# Patient Record
Sex: FEMALE | URBAN - METROPOLITAN AREA
[De-identification: names, ages, dates, MRNs, and addresses within clinical notes are randomized per-mention and may not be internally consistent; named-entity substitution may affect disease eponyms.]

---

## 2017-01-10 NOTE — TELEPHONE ENCOUNTER
See message,Arrived late last night from [place], Sunday  had chills and mosquito bite on upper posterior left arm, taking tylenol no further chills but generalized body aches, drinking water and tea, feels nauseated with food, requesting to be seen sooner

## 2017-01-10 NOTE — TELEPHONE ENCOUNTER
Pt calling in stts she just got back from [place] has a mosquito bit and know id having fever and muscle pain please advise   Pt has a 10m old that she breast feed ans unsure if she should worried   Pt has been scheduled for tomorrow

## 2017-01-11 NOTE — PROGRESS NOTES
HPI:    Patient ID: Marcia Royal is a 32year old female. Fever   This is a new problem. The current episode started in the past 7 days (3 to 4 days). The problem occurs constantly. The problem has been unchanged.  The maximum temperature noted was 101 distress. HENT:   Right Ear: External ear normal.   Left Ear: External ear normal.   Nose: Nose normal.   Mouth/Throat: Oropharynx is clear and moist. No oropharyngeal exudate.    Eyes: Conjunctivae are normal. Pupils are equal, round, and reactive to lig Human, Igg/Igm [E]; Future  - XR CHEST PA + LAT CHEST (CPT=71020); Future  - Respiratory Panel FLU A + B, RSV [E]    3. Encounter for pregnancy test, result unknown  Negative.     - POC Urine pregnancy test [[id number]]      No orders of the defined types were p

## 2018-05-13 NOTE — TRIAGE
[facility]      Triage Note    Paula Ellis Patient Status:  Observation    1985 MRN [id number]   Location [address] Attending Gurjit Spear, *   Hosp Day # 0 PCP Bruno Urbina MD noticed a streak of blood when wiping after urinating. Denies urinary complaint. Denies LOF. This am, she noticed blood again with wiping. No recent intercourse. Speculum exam perfomed per verbal order. White discharge from cervical os noted.  Blood noted i

## 2018-05-13 NOTE — PROGRESS NOTES
Pt presents c/o pelvic pain starting in her hips and radiating to her symphysis pubis. No relieving/aggravating factors. Pt also having pain in her low back. Pt states last night she noticed a streak of blood when wiping after urinating.  Denies urinary com

## 2018-06-11 NOTE — TRIAGE
[facility]      Triage Note    Keren Hong Patient Status:  Observation    1985 MRN [id number]   Location [facility] Attending Elisa Padilla MD   Hosp Day # 0 PCP MD Jason Medel Nonstress Test Second Interpretation: Appropriate for gestational age          FHR Category: Category I           Additional Comments       Reason for visit: Pt came in with c/o \"waking up to a fluid/blood tinged soaked area on the sofa\".  Pt denies

## 2018-08-03 NOTE — L&D DELIVERY NOTE
[facility] - [facility]    Vaginal Delivery Note    Travis Shay Patient Status:  Inpatient    1985 MRN [id number]   Location [address] Attending Ed Rivera MD   Hosp Day # 0 PCP MD Daysi Breaux

## 2018-08-03 NOTE — H&P
45  R[facility]hi & Opal Childers Patient Status:  Inpatient    1985 MRN [id number]   Location [address] Attending Will Kincaid MD   Hosp Day # 0 PCP Gaurav Davison MD     Date

## 2018-08-03 NOTE — PROGRESS NOTES
· Nitrous Oxide self-administration education provided by: Jackie Jay  · Provider instructed patient and support person on the use of nitrous oxide use for pain management and self-administration policy  · Consent obtained  · Equipment set up done by [facility]

## 2018-08-03 NOTE — DISCHARGE SUMMARY
[facility] - [facility]    Discharge Summary    Raúl Briscoe Alvarado Patient Status:  Inpatient    1985 MRN [id number]   Location [address] [facility] Attending Ed Rivera MD   [facility] Day # 2       Admission Date: 8/3/2018  Disc

## 2018-08-04 NOTE — LACTATION NOTE
LACTATION NOTE - MOTHER      Evaluation Type: Inpatient         Maternal history  Maternal history: Anemia    Breastfeeding goal  Breastfeeding goal: To maintain breast milk feeding per patient goal    Maternal Assessment  Bilateral Breasts: Soft  Bilatera

## 2018-08-04 NOTE — PROGRESS NOTES
[facility]    OB/GYNE Progress Note      Chio Childers Patient Status:  Inpatient    1985 MRN [id number]   Location [facility] 3SE Attending Yadira Long MD   Hosp Day # 1 PCP Joi Perry MD       Assessment/Geo

## 2018-08-04 NOTE — LACTATION NOTE
This note was copied from a baby's chart.   LACTATION NOTE - INFANT    Evaluation Type  Evaluation Type: Inpatient    Problems & Assessment  Problems Diagnosed or Identified: 37-38 weeks gestation  Infant Assessment: Skin color: pink or appropriate for ethn

## 2018-08-05 NOTE — LACTATION NOTE
Infant feeding well at the breast per mom. Mom has pump at home and advised to make an outpatient appointment if any difficulties with feeding.

## 2018-08-05 NOTE — PROGRESS NOTES
[facility]    OB/GYNE Progress Note      Robert Childers Patient Status:  Inpatient    1985 MRN [id number]   Location [facility] 3SE Attending Leigh Inman MD   Hosp Day # 2 PCP Madisyn Jauregui MD       Assessment/Geo

## 2020-05-28 NOTE — TELEPHONE ENCOUNTER
PT WENT TO ER AT [facility] AND WAS TOLD SHE IS PREGNANT. STATES IRREGULAR CYCLES, WILL SOMETIMES SKIP A MONTH BUT USUALLY NEVER MORE THAN THAT. PT DOES NOT LIKE HER PREVIOUS OB GROUP AND WOULD LIKE TO GET CARE WITH US.   MISSED MENSES APPT SCHEDULED WITH MICHELE PÉREZ

## 2020-05-28 NOTE — TELEPHONE ENCOUNTER
Per pt unsure on LMP as cycles are irregular and states found out she was pregnant in the ER.  Please advise

## 2020-06-02 NOTE — PROGRESS NOTES
Paula Ellis is a 29year old female  No LMP recorded (lmp unknown). Patient presents with:  Gyn Problem: missed menses. Cari Mustelias went to the emergency room and had and ultrasound done and was told she was pregnant but nothing was found on the ultrasound i lesions  Urethral Meatus:  normal in size, location, without lesions and prolapse  Bladder:  No fullness, masses or tenderness  Vagina:  Normal appearance without lesions, no abnormal discharge  Cervix:  Normal without tenderness on motion  Uterus: normal

## 2020-06-02 NOTE — TELEPHONE ENCOUNTER
Reviewed US results with patient. Reviewed gestational sac without embryo. HCG pending from day. Recommend repeat in 48 hours to monitor better. Pt feeling well no pain.

## 2020-06-04 NOTE — TELEPHONE ENCOUNTER
Called patient. Reviewed HCG levels. HCG is rising although it is not as much as we would hope. This may be a miscarriage but we need to repeat the ultrasound. She will need to repeat ultrasound in 7 days. Reviewed bleeding precautions.

## 2020-06-10 NOTE — TELEPHONE ENCOUNTER
Called by [address] with results. C/w blighted ovum. Spoke to patient. Discussed r/b of expectant mgmt, cytotec, and suction D&C. Pt wishes to speak with her  and will let us know. All questions answered.     Us Pregnancy Less Than 14 Weeks (transabdom

## 2020-06-12 NOTE — TELEPHONE ENCOUNTER
PT IS HAVING A HARD TIME MAKING A DECISION. ANSWERED QUESTIONS RE: SURG IS GEN ANESTHESIA AND WOULD NEED ORDER TO BE SCHEDULED EARLY NEXT WEEK, NOT DONE ON WEEKENDS. OR CYTOTEC IS PLACED IN THE OFFICE. WILL HAVE BAD PERIOD CRAMPING UNTIL TISSUE IS EXPELLED THEN A MORE REGULAR PERIOD LIKE BLEED FOR ABOUT 4-7 DAYS. PT WILL CALL BACK LATER WITH DECISION.

## 2020-06-12 NOTE — TELEPHONE ENCOUNTER
Pt talked to [address] [place] [address] about a [address] or a taking Medicine.  Pl has additional questions to help her make a decision

## 2020-06-16 NOTE — TELEPHONE ENCOUNTER
OK per KCB. Appt made at 8:40 is [facility]. Pt informed. Message to [address] for dosing of cytotec. Per Ray Mendoza, we need dosing to send with KCB to [facility]. Once we have dose, please send to Ray Mendoza.

## 2020-06-16 NOTE — TELEPHONE ENCOUNTER
Pt asked if she will need someone to drive her to the appt. Pt informed she should be able to drive after placement. Pt is requesting an appt with a woman  tomorrow. The only appt available tomorrow is with BONNIE in [facility].   Pt informed we will have to letha

## 2020-06-17 NOTE — PATIENT INSTRUCTIONS
· Please call with any questions or concerns. · Please call with heavy vaginal bleeding that is saturating more than a pad an hour for two hours or if you have lightheadedness/dizzines, shortness or breath or chest pain.    · Please call with severe julienne

## 2020-06-17 NOTE — PROGRESS NOTES
Keren Hong is a 29year old  who presents today for cytotec placement. She has blighted ovum on US. Reviewed US results with patient. She denies bleeding and cramping at this time.      OBSTETRICS HISTORY:  OB History     T4    L4    SAB Other Topics      Concerns:         Service: Not Asked        Blood Transfusions: Not Asked        Caffeine Concern: No        Occupational Exposure: Not Asked        Hobby Hazards: Not Asked        Sleep Concern: Not Asked        Stress Concern: N misoprostol (CYTOTEC) tab 800 mcg        Plan:  1. 800mcg cytotec placed PV  2. Reviewed risks of bleeding, incomplete AB (need for D&C) and infection. 3. Reviewed heavy vaginal bleeding and severe cramping can occur (declines Norco).  Reviewed to watch b

## 2020-06-22 NOTE — TELEPHONE ENCOUNTER
Pt states she was supposed to call Friday with an update but she forgot. Pt states she had bleeding that was slightly heavier than a period for the first 2 days, then it turned like a period. Today she is having light bleeding.   Pt states she did not pas

## 2020-06-22 NOTE — TELEPHONE ENCOUNTER
Informed pt that [address] stated that she should go to the lab in the next few days to get the hcg drawn. Pt stated understanding that she will go for the hcg and call the next day for the results.     Informed pt that [address] stated that we will see how her bleedi

## 2020-06-22 NOTE — TELEPHONE ENCOUNTER
Please have patient go to the lab in the next few days to get HCG drawn. We will see how her bleeding is this week and how her HCG drops. Please have her see me on Tuesday the 30th at 12:40.  Please let Moose Voss know I will need staffing

## 2020-06-22 NOTE — TELEPHONE ENCOUNTER
Sent to [place] to add pt to [employer] schedule and provide staffing for [facility].

## 2020-06-30 NOTE — TELEPHONE ENCOUNTER
Pt was coming in to see Govind Collins today for Follow up with Govind Collins first available apt is 7/24, pt wants to know if ok to wait that long

## 2020-06-30 NOTE — TELEPHONE ENCOUNTER
Pt was scheduled today at 1240pm for f/u and had to cx due to her car breaking down. Pt reports current spotting. Pt denies pain or cramping. Informed pt message will be sent to [facility] to see when she can be seen. Pt verbalized understanding.

## 2020-06-30 NOTE — TELEPHONE ENCOUNTER
No problem. Please have patient get blood drawn for HCG again. She can be booked in two OB slots or New OB slot for follow up. This can be when it works for patient.

## 2020-06-30 NOTE — TELEPHONE ENCOUNTER
Informed pt of KCB recs below. Assisted pt with scheduling appt for 7/8/2020 at 8am in [facility]. Location information provided to pt. Pt verbalized understanding.

## 2020-07-04 NOTE — ED PROVIDER NOTES
Patient Seen in: [facility] Emergency Department    History   Patient presents with:  Dimas-ROSSY      HPI    Patient presents to the ED complaining of heavy vaginal bleeding starting tonight.   She states that she woke with a pool of blood, lower abdom was already wearing a droplet mask on my arrival to the room.  Personal protective equipment including droplet mask, eye protection, and gloves were worn throughout the duration of the exam.  Handwashing was performed prior to and after the exam.  [facility] Please view results for these tests on the individual orders. TYPE AND SCREEN    Narrative: The following orders were created for panel order TYPE AND SCREEN.                   Procedure                               Abnormality uterine bleeding versus now miscarrying from previous Cytotec. Patient improved during her ED stay. Bleeding steadily decreased until minimal at time of discharge. No significant anemia. Vital signs stable.   I feel stable for discharge home with outpat

## 2020-07-04 NOTE — ED INITIAL ASSESSMENT (HPI)
Received cytotech for miscarriage on 6/17, bleeding since.  Tonight, pt woke up in a pool of blood. +dizziness and states she feels like she is going to pass out. +abd cramping

## 2020-07-08 NOTE — PROGRESS NOTES
Michael Laguna is a 29year old female  No LMP recorded (lmp unknown). Patient presents with: Follow - Up    The patient is a 30yo female who presents today for follow up after cytotec placement after mAB. Pt had cytotec placement on 2020.  She on file    Relationships      Social connections:        Talks on phone: Not on file        Gets together: Not on file        Attends Confucianist service: Not on file        Active member of club or organization: Not on file        Attends meetings of clubs depression or anxiety     07/08/20  0820   BP: 104/68   Pulse: 65       PHYSICAL EXAM:   Constitutional: well developed, well nourished  Head/Face: normocephalic  Abdomen:  soft, nontender, nondistended, no masses  Skin/Hair: no unusual rashes or bruises

## 2021-02-18 NOTE — PROGRESS NOTES
HPI:    Patient ID: Bimal Jansen is a 28year old female. Patient presents today for her annual physical.  She complains of current episode of swelling or fluid retention of her hands as well as her feet and her  face after having eaten [place] food. Smokeless tobacco: Never Used    Alcohol use: No    Drug use: No       PHYSICAL EXAM:    Physical Exam         ASSESSMENT/PLAN:   (Z00.00) Annual physical exam  (primary encounter diagnosis)  Plan: CBC WITH DIFFERENTIAL WITH PLATELET, COMP         METABOLI

## 2021-04-03 NOTE — TELEPHONE ENCOUNTER
From: Rufina Henderson Sales  To: Juan Pablo Garza MD  Sent: 4/2/2021 6:08 PM CDT  Subject: Non-Urgent Medical Question    Hello,    I have an appt scheduled for April 6 for a physical and an annual pap smear.  I just found out last week via home pregnant test that

## 2021-04-06 NOTE — TELEPHONE ENCOUNTER
Pt states +HPT, pt states irregular periods, pt states they are sometimes a week off.  Pt believes her LMP was the last week of Feb.     Pt informed of male and female providers and rotation of PN appts with all of them and OB on-call will deliver, pt state

## 2021-04-13 NOTE — TELEPHONE ENCOUNTER
Called ultrasound and talked to a Dami. Informed ultrasound that pt's appt with ULISES was 4/23 for her New OB MD appt. Informed her that pt needs her ob ultrasound appt. Ultrasound Department, Dami, gave appt for Sharif 10:45 AM on 4/22.     Inform

## 2021-04-13 NOTE — TELEPHONE ENCOUNTER
Patient had OB EDU visit and needed to schedule an ultrasound before her doctors visit on April 23rd. States no appointments available until April 27th.      Please advise patient if this is ok

## 2021-04-13 NOTE — PROGRESS NOTES
Pt called for OBN appt today with no complaints. 1hr glucose, Hep C, OB ultrasound for dating and Normal PN labs ordered. Pt advised all labs must be completed and resulted 2 days prior to MD appt.  RN scheduled NPN appt with MD.    Pt HT: 5'1\" WT: 105

## 2021-04-20 NOTE — TELEPHONE ENCOUNTER
----- Message from Kirill Juan MD sent at 4/20/2021  2:42 PM CDT -----  Urine culture-- entercocccus.    Macrobid x 7 days

## 2021-04-21 NOTE — TELEPHONE ENCOUNTER
Pt transferred from call center with questions about her US that is scheduled for tomorrow. Pt stated she needs US done for dating due to irregular cycles.  Pt informed of message below from Arabella Ott that [facility] community states that pt needs to come in for a pel

## 2021-04-21 NOTE — TELEPHONE ENCOUNTER
Since pt Is [facility] must come in for appt (GYNE) to get a pelvic exam done in order for [employer] approve authorization .     Related message to pt verbalized understandings

## 2021-04-22 NOTE — TELEPHONE ENCOUNTER
Patient was going to go for an ultrasound today to help determine how far along she is. Her insurance is requiring a pelvic exam be done first.  She would like to verify that the pelvic exam will be done at her New OB appt tomorrow. Please advise.

## 2021-04-22 NOTE — TELEPHONE ENCOUNTER
Pt informed that a pelvic exam will be done at her appt. Pt asking if it ok that she will not have the [address] prior to her appt. She thought she needed it done before she was to be seen by the md.  Pt reassured that it is ok that US was not done.   Pt advised

## 2021-04-23 NOTE — TELEPHONE ENCOUNTER
[id number] sent for clinical review, uploaded clinicals to [employer]        [id number] placed, [employer] sent to pt advising must get ultrasound done before scheduling FTS, MFM number provided via [employer]     Will obtain FTS PA once gets US done , has US vickie

## 2021-04-23 NOTE — PROGRESS NOTES
New OB. No major complaints. Wants genetics. Unknown LMP. History of irregular cycles. No menses in January and had a period sometime in mid February. Plan for US for dates. History of recurrent UTIs and on abx currently.   Plan for urine culture q tr

## 2021-04-23 NOTE — TELEPHONE ENCOUNTER
Pt with unknown LMP and irregular cycles. Needs US for dates first.  Pt with [facility]. Seen today for New OB visit. Also will want MFM FTS, but needs to be done once US completed for dates. Will need level 2 for AMA and history of IUGR.

## 2021-05-06 NOTE — TELEPHONE ENCOUNTER
Informed pt that pap/hpv neg. Informed pt that yeast on pap. Informed pt that [address] stated given preg, rec Monistat 7. Pt stated understanding.

## 2021-05-06 NOTE — TELEPHONE ENCOUNTER
----- Message from Lon Duverney, DO sent at 5/6/2021 12:52 PM CDT -----  Pap/hpv neg. Yeast on pap.   Given pregnancy, rec monistat 7

## 2021-05-06 NOTE — TELEPHONE ENCOUNTER
NADIA is updated based on 5/3 ultrasound.  Kenzie Mcgrath from scheduling in communication with patient (see TE on 4/23) regarding genetics with MFM coordination.         ----- Message from Sheryle Proud, DO sent at 5/6/2021  1:06 PM CDT -----  Please update NADIA

## 2021-05-11 NOTE — PROGRESS NOTES
Outpatient Maternal-Fetal Medicine Consultation    Dear Dr. Yuki Finney    Thank you for requesting ultrasound evaluation and maternal fetal medicine consultation on your patient Melanie CHEN Alvarado.   As you are aware she is a 28year old female [id number] with a si acetaminophen 500 MG Oral Tab, Take 1,000 mg by mouth.    (Patient not taking: Reported on 4/23/2021 ), Disp: , Rfl:   •  Cholecalciferol (VITAMIN D3) 125 MCG (5000 UT) Oral Tab, Take 5,000 Units by mouth., Disp: , Rfl:   •  ferrous sulfate 325 (65 FE) MG growth (at 32 weeks). In addition, weekly NST's (initiating at 36 weeks gestation for women 35-39 years and at 28 weeks gestation for women 40 years and older) are also advised.   Routine obstetric care is more than adequate to assess for gestational diabe fetal DNA  today, declined invasive testing  · Prior pregnancy complicated by IUGR    RECOMMENDATIONS:  · Continue care with Dr. Christopher Crocker II at 20 weeks.   Follow-up Growth ultrasound at 26 & 32 weeks -more frequent growth ultrasounds and fetal surv

## 2021-05-11 NOTE — TELEPHONE ENCOUNTER
Spoke to pt. Advised PA has been approved, and has nothing to worry about.  Verbalized understandings

## 2021-05-20 NOTE — TELEPHONE ENCOUNTER
HARMONY screening results obt  Reviewed by MD Arzola Men  Low risk for  Trisomy 21  1:83235 risk  Low risk for Trisomy 18/13  1:43327 risk  Fetal sex:  Male   LVMW#TCB   Copy of results sent for scanning into pt record

## 2021-06-30 NOTE — PROGRESS NOTES
Outpatient Maternal-Fetal Medicine Consultation    Dear Dr. Senait Sylvester    Thank you for requesting ultrasound evaluation and maternal fetal medicine consultation on your patient Nael Childers.   As you are aware she is a 28year old female [id number] with a si ferrous sulfate 325 (65 FE) MG Oral Tab EC, Take 325 mg by mouth 2 (two) times daily with meals. , Disp: , Rfl:   •  Ascorbic Acid (VITAMIN C) 1000 MG Oral Tab, Take 1,000 mg by mouth daily. , Disp: , Rfl:   •  PRENATAL 27-0.8 MG Oral Tab, Take 1 tablet by as a result of diminished uteroplacental blood flow from faulty development, acquired obstruction, or disruption of the uteroplacental vasculature.  Examples of these disorders include hypertension, renal insufficiency, diabetes, collagen vascular disease, with Dr. Sundeep Bowers  Follow-up Growth ultrasound at 26 & 32 weeks -more frequent growth ultrasounds and fetal surveillance if recurrent IUGR noted  Weekly NST's at 36 weeks. Thank you for allowing me to participate in the care of your patient.   Please

## 2021-07-02 NOTE — TELEPHONE ENCOUNTER
----- Message from [facility] sent at 6/30/2021 11:54 AM CDT -----  Regarding: MFM PRIOR AUTHORIZATION  PATIENT SCHEDULED FOR GROWTH AND BPP 8/11/21 AND REQUIRES PRIOR AUTHORIZATION. 12906 AND 13241.  THANK YOU.    BLAISE SANDOVAL  PSR AT [facility] MATERNAL FET

## 2021-07-02 NOTE — TELEPHONE ENCOUNTER
Called [facility] and spoke to Mrs. Guo to initiate PA for [id number], [id number]. Dx: O09.529, O09.291. Appt is scheduled on 8/11/21. Case #[id number] and [id number]. Await response.

## 2021-07-07 NOTE — TELEPHONE ENCOUNTER
Received fax notification of approval for 35159 x2 units. Received fax notification of denial for 76049 x2 units due to pt needs to be at least 32 weeks pregnant. Pt is not scheduled for 47321 yet.  Will wait for PA process due to order can change to week

## 2021-08-10 ENCOUNTER — TELEPHONE (OUTPATIENT)
Dept: OBGYN CLINIC | Facility: CLINIC | Age: 36
End: 2021-08-10

## 2021-08-10 DIAGNOSIS — M54.32 SCIATIC PAIN, LEFT: Primary | ICD-10-CM

## 2021-08-10 NOTE — TELEPHONE ENCOUNTER
LMTCB. Order placed for physical therapy. Please provide pt with # to physical therapy when she calls back: [phone]. Mattie Corey thank you.

## 2021-08-10 NOTE — PROGRESS NOTES
C/o ankle swelling- rec compression tights/socks, c/o L sciatica- rec tylenol- pt wants to see PT-will refer, labs normal

## 2021-08-11 NOTE — PROGRESS NOTES
Outpatient Maternal-Fetal Medicine Consultation    Dear Dr. Jovita Conroy    Thank you for requesting ultrasound evaluation and maternal fetal medicine consultation on your patient Brianna Childers.   As you are aware she is a 28year old female [id number] with a si ferrous sulfate 325 (65 FE) MG Oral Tab EC, Take 325 mg by mouth 2 (two) times daily with meals. , Disp: , Rfl:   •  Ascorbic Acid (VITAMIN C) 1000 MG Oral Tab, Take 1,000 mg by mouth daily. , Disp: , Rfl:   •  PRENATAL 27-0.8 MG Oral Tab, Take 1 tablet by as a result of diminished uteroplacental blood flow from faulty development, acquired obstruction, or disruption of the uteroplacental vasculature.  Examples of these disorders include hypertension, renal insufficiency, diabetes, collagen vascular disease,

## 2021-08-14 NOTE — TELEPHONE ENCOUNTER
----- Message from Hansa Beard MD sent at 8/13/2021  1:24 PM CDT -----  Pt is anemic and needs to start slow fe 1 tab po every day at a separate time from her pnv.   She also has abnormal 1 hour GTT and needs to have a 3 hour GTT- please order

## 2021-08-23 NOTE — PROGRESS NOTES
PHYSICAL THERAPY  EVALUATION:   Referring Physician: Dr. Dillon Sung  Diagnosis: Sciatic pain, left (M54.32)  Back Pain in Pregnancy  Date of Onset: 8/10/21  Date of Service: 8/23/2021     PATIENT Kathryn Hager is a 28year old female who presents t • UTI (urinary tract infection)     Frequent         Safety questions:    Are you being hurt, frightened, demeaned, or taken advantage of by anyone at your home or in your life? No       Have you recently had thoughts of hurting yourself?   No    Have yo below  Tenderness noted on paraspinals and pubic symphysis and  L gluteal/PSIS area  upon palpation  Pt noted to have decreased and painful  SLS on LLE with impaired load transfers on each LE. Eccentric control cannot be tested at this time. Transitional tr are WFL painfree done actively except LLE motions; All motions are painful: L hip flexion limited at 50 degrees assisted, 20 degrees abd and painful with ER/IR.  She is only able to flex  Knee at 110 versus 140 on th R side  MMT are modified and painful when and outcome measures, this evaluation involved Moderate Complexity decision making due to 3+ personal factors/comorbidities, 3 body structures involved/activity limitations, and evolving symptoms including changing pain levels.     PLAN OF CARE:    Goals: ( sign and return this letter via fax as soon as possible to [phone].  If you have any questions, please contact me at Dept: [phone]    Sincerely,  Electronically signed by therapist: Pepper Frias  Physician's certification required:

## 2021-08-25 NOTE — PROGRESS NOTES
Dx:  Sciatic pain, left (M54.32)  Back Pain in Pregnancy         Insurance   [facility] community         Authorized  # visits by insurance  :  13   Expiration date  of Authorization:10/22/21 (90 days from eval otherwise)  Initial POC# of visits: 12         Eval d isometrics  10 secs x10  SL clams attempted:painful  Supine hooklying hip abd with isometrics painful         Manual: MFR in R SL for  L/PSIS  gluteal area and L side paraspinals:gentle lengthening and circular techniques  PT did break  STM times in jet

## 2021-09-01 ENCOUNTER — TELEPHONE (OUTPATIENT)
Dept: OBGYN CLINIC | Facility: CLINIC | Age: 36
End: 2021-09-01

## 2021-09-01 NOTE — TELEPHONE ENCOUNTER
29w3d. Pt states she noted that when walking up the stairs she felt dampness between her legs. Pt states she went to the BR and noted some wetness to her underwear and pants. Pt states she does not believe she urinated on herself. Pt state fluid does not have a color or odor. Pt denies UTI s/s. Pt states +FM, denies VB or ctxs. Pt states baby is kicking a lot and she feels pressure in her pelvic region. Pt instructed to void, place rohan pad and stay in upright position for the next hour and that RN will call her back to see if pad has fluid. Pt instructed to call back if gross rupture noted with fluid running down leg or if pad becomes soaked before hour is over. Pt states understanding.

## 2021-09-01 NOTE — TELEPHONE ENCOUNTER
Called pt back, pt check rohan pad while on the phone with RN. States she notes wetness on the pad. Pt states the fluid on pad is clear, and approx quarter size amount. Pt states she still feels pelvic pressure, that has not increased since earlier call. Pt instructed to head to [facility] triage to r/o SROM. Informed pt to make sure to wear rohan pad to [facility]. Instructions on where to park and enter the building, including use of the phone at [facility] entrance explained. Pt states understanding and will head to [facility] now. Kiesha De La Torre RN [facility] Triage notified of pt coming. To JANIS on-call, mina. Thank you.

## 2021-09-02 NOTE — TRIAGE
[facility]      Triage Note    Carroll Cliftonnadai Patient Status:  Observation    1985 MRN [id number]   Location [address]  Attending Guicho Steinberg, 3 [facility] Day # 0 PCP Ailyn Walden MD     [facility] Interpretation: Appropriate for gestational age                                    Additional Comments       Patient presents with:  R/o Rom: c/o of wet,moist underwear last week and today at 5 pm, no contractions  's, NST reactive. Speculum done.  a

## 2021-09-02 NOTE — PROGRESS NOTES
Pt is a 28year old female admitted to TR2/TR2-A. Patient presents with:  R/o Rom: c/o of wet,moist underwear last week and today at 5 pm, no contractions     Pt is [id number] 29w3d intra-uterine pregnancy. History obtained, consents signed.  Oriented to ro

## 2021-09-08 NOTE — PROGRESS NOTES
Pt advised of results and recs. Pt states she is on day 5 of Monistat. Advised pt to complete Monistat and does not need duplicate med with Diflucan. Pt agrees.

## 2021-09-09 NOTE — PROGRESS NOTES
No S/S of PTL. Seen [facility] last week to r/o SROM. Treated for yeast after culture. Alberto Daily and [place] all well. Will need PEDS for hospital only.

## 2021-09-10 NOTE — PROGRESS NOTES
Dx:  Sciatic pain, left (M54.32)  Back Pain in Pregnancy         Insurance   [facility] community         Authorized  # visits by insurance  :  13   Expiration date  of Authorization:10/22/21 (90 days from eval otherwise)  Initial POC# of visits: 12         Eval d

## 2021-09-21 NOTE — PROGRESS NOTES
Outpatient Maternal-Fetal Medicine Consultation    Dear Dr. Gabo Kuo    Thank you for requesting ultrasound evaluation and maternal fetal medicine consultation on your patient Pawan Childers.   As you are aware she is a 28year old female [id number] with a si ferrous sulfate 325 (65 FE) MG Oral Tab EC, Take 325 mg by mouth 2 (two) times daily with meals. , Disp: , Rfl:   •  Ascorbic Acid (VITAMIN C) 1000 MG Oral Tab, Take 1,000 mg by mouth daily. , Disp: , Rfl:   •  PRENATAL 27-0.8 MG Oral Tab, Take 1 tablet by as a result of diminished uteroplacental blood flow from faulty development, acquired obstruction, or disruption of the uteroplacental vasculature.  Examples of these disorders include hypertension, renal insufficiency, diabetes, collagen vascular disease,

## 2021-09-21 NOTE — TRIAGE
[facility]      Triage Note    Yolanda Reyes Patient Status:  Observation    1985 MRN [id number]   Location [address]  Attending Kayy Weiner MD   Hosp Day # 0 PCP MD Joshua Mcpherson FHR Category: Category I             Additional Comments   Pt arrived from [facility] with complaints of abdominal discomfort and cramping since 2130 and leaking of fluid around 2130. Pt reports good fetal movement, denies vaginal bleeding.  FHT r

## 2021-09-21 NOTE — PROGRESS NOTES
Pt for Growth US  States abd tightening and discomfort since last shweta   Did not call OB  It went away and resumed at 5 am pt appears uncomforable  States active fetus

## 2021-09-21 NOTE — PROGRESS NOTES
Dx:  Sciatic pain, left (M54.32)  Back Pain in Pregnancy         Insurance   [facility] community         Authorized  # visits by insurance  :  13   Expiration date  of Authorization:10/22/21 (90 days from eval otherwise)  Initial POC# of visits: 12         Eval d Treatment: 0 min  Total Treatment Time: 10 min

## 2021-09-23 NOTE — TELEPHONE ENCOUNTER
Pt is 32 weeks and needs a note if she can continue therapy session,  Therapist is worry some of the exercises might cause contraction.  Pt is asking if a nurse can call her .  ,

## 2021-09-23 NOTE — TELEPHONE ENCOUNTER
Therapist send a note to [facility] per prenatal pt. Therapist stating pt will need a clearance to continue with PT for sciatica. Pt states she had had  PT therapy before her [facility] appointment. She had felt contractions and pt states she went to [facility].       The

## 2021-09-27 NOTE — TELEPHONE ENCOUNTER
Pt  Didn't come in  For a appt and has therapy today asking fi she can do therapy and needs a letter to put in my chart , .   Asking for a Nurse call you  ,

## 2021-09-27 NOTE — TELEPHONE ENCOUNTER
Is it ok for pt to come in today (9/27) in [facility] with Dr. Shruti Josue at 2pm or 2:10pm?  Pt has cancelled PT.     Please advise

## 2021-09-27 NOTE — TELEPHONE ENCOUNTER
Pt going to see Rosalio Avila today at [place] and cxl her PT appt for today. Once she sees ELMAK and talks to her they will discuss if she can continue with PT. Transferred to [facility] to help pt cxl her appt and schedule appt today with MICHELE.

## 2021-09-29 NOTE — PROGRESS NOTES
Tdap administered to pts right deltoid. Pt tolerated injection well. Pt also stated she needed clearance note from MD clearing her to go to back to PT. Pt stated she was seen in [facility] last week with contractions. No issues since then.  Spoke with SAMUEL who

## 2021-09-29 NOTE — TELEPHONE ENCOUNTER
PN has an appointment for Oct. 7th, 2021 - Too early -  (appointment has not been cancelled), needs to be reschedule to the week of Oct. 11th. 2021.

## 2021-10-01 NOTE — PROGRESS NOTES
Dx:  Sciatic pain, left (M54.32)  Back Pain in Pregnancy         Insurance   [facility] community         Authorized  # visits by insurance  :  13   Expiration date  of Authorization:10/22/21 (90 days from eval otherwise)  Initial POC# of visits: 12         Eval d painful: but able to do [place] in all planesShe is  able to flex   L  Knee = R side    Goals:   goals addressed this day as noted above  Goals: (to be met in 12 visits)  1.   Pt will be I with HEP,its progression and management of symptoms/precautions, biomech exercises no resistance flex/abd/ext:pain on LLE loading  Shuttle BLE 4 bands 2x10  Shuttle R/ LE 2 bands 2x10  Pelvic motions in quadruped: side to side/cat and camel x10  Child pose x30 secs       Manual: MFR in R SL for  L/PSIS  gluteal area and L side

## 2021-10-05 NOTE — PROGRESS NOTES
Dx:  Sciatic pain, left (M54.32)  Back Pain in Pregnancy         Insurance   [facility] community         Authorized  # visits by insurance  :  13   Expiration date  of Authorization:10/22/21   Initial POC# of visits: 12         Eval date/latest PN:8/23/21    Auth be met in 12 visits)  1. Pt will be I with HEP,its progression and management of symptoms/precautions, biomechanical strategies and self correction of upright posturing to maintain gains in therapy:reports compliance  2. Pt will report overall decreased in flex/abd/ext:pain on LLE loading  Shuttle BLE 4 bands 2x10  Shuttle R/ LE 2 bands 2x10  Pelvic motions in quadruped: side to side/cat and camel x10  Child pose x30 secs   NU step level 4 x6 mins:inc pain on lower abdominals better after walking  Standing B

## 2021-10-07 NOTE — PROGRESS NOTES
Dx:  Sciatic pain, left (M54.32)  Back Pain in Pregnancy         Insurance   [facility] community         Authorized  # visits by insurance  :  13   Expiration date  of Authorization:10/22/21   Initial POC# of visits: 12         Eval date/latest PN:8/23/21    Auth flex   L  Knee = R side    Goals:   goals addressed this day as noted above  Goals: (to be met in 12 visits)  1.   Pt will be I with HEP,its progression and management of symptoms/precautions, biomechanical strategies and self correction of upright posturin L hip  Tra with hip isometrics 10 secs x10  Standing hip exercises no resistance flex/abd/ext:pain on LLE loading  Shuttle BLE 4 bands 2x10  Shuttle R/ LE 2 bands 2x10  Pelvic motions in quadruped: side to side/cat and camel x10  Child pose x30 secs   NU s

## 2021-10-12 NOTE — PROGRESS NOTES
Dx:  Sciatic pain, left (M54.32)  Back Pain in Pregnancy         Insurance   [facility] community         Authorized  # visits by insurance  :  13   Expiration date  of Authorization:10/22/21   Initial POC# of visits: 12         Eval date/latest PN:8/23/21    Auth compliance  2. Pt will report overall decreased in pain and symptoms and functional limitations  by 50% or better for ease of walking/standing as pregnancy progresses for ease of bed mobility:40%  3. Pt will be able to do progressive lumbropelvichip stabiliz 2x10 NU step level 4 x6 mins  Standing B  hip ex flex/ext 2x10 xRTB   Shuttle BLE 5 bands 2x10  Shuttle R/ LE 3 bands 2x10  SL hip abd 2x10  SL clams 2x10  SL hip flexion 2x10   Manual: STM with foam roll x3 mins,  MFR on PSIS/gluteal area   MFR on PSIS/gl

## 2021-10-19 NOTE — PROGRESS NOTES
Outpatient Maternal-Fetal Medicine Consultation    Dear Dr. Cinthya Silva    Thank you for requesting ultrasound evaluation and maternal fetal medicine consultation on your patient Ysabel Childers.   As you are aware she is a 28year old female [id number] with a si hours as needed for Pain., Disp: , Rfl:   •  Cyanocobalamin (VITAMIN B 12 OR), Take by mouth.  , Disp: , Rfl:   •  ferrous sulfate 325 (65 FE) MG Oral Tab EC, Take 325 mg by mouth 2 (two) times daily with meals. , Disp: , Rfl:   •  Ascorbic Acid (VITAMIN C) pregnancies. Karyotypic abnormalities account for up to 20 percent of all FGR.     Maternal medical disorders may cause FGR as a result of diminished uteroplacental blood flow from faulty development, acquired obstruction, or disruption of the uteroplacen

## 2021-10-20 NOTE — TELEPHONE ENCOUNTER
Called Omar and spoke to [place]. [facility] pt called this morning and has an appt scheduled with Dr Loretta Patel on 10/22 at 9:30 am. [facility] Dr Neetu Cotton will be out of clinic for 3 weeks.

## 2021-10-20 NOTE — TELEPHONE ENCOUNTER
----- Message from Blake Rashid DO sent at 10/19/2021  5:18 PM CDT -----  I called Chanelle and discussed worsening anemia. She is compliant with daily PNV and separate Slow Fe daily. Hgb is decreasing. Please arrange referral for Hematology.  Code is on

## 2021-10-22 NOTE — TELEPHONE ENCOUNTER
I called Chanelle to schedule her iron infusion appt. There was no answer so I left a voicemail offering 10/27 at 2pm, asking her to call back to schedule.

## 2021-10-22 NOTE — CONSULTS
[address]  Initial Hematology Clinic Consult Note    Patient Name: Yulisa Dick   YOB: 1985   Medical Record Number: [id number]  Referring Physician(s): Mauro Alfaro [address] [place] [address]    Reason for consultation: anemia of pregnancy RASH    Family Medical History:  Family History   Problem Relation Age of Onset   • Diabetes Father    • Lipids Father    • Hypertension Father    • Lipids Mother        Social History:  Social History    Socioeconomic History      Marital Clubs or Organizations: Not on file      Attends Club or Organization Meetings: Not on file      Marital Status: Not on file  Intimate Partner Violence:       Fear of Current or Ex-Partner: Not on file      Emotionally Abused: Not on file      Physically A deficiency anemia at this time. We will check iron studies today and pending those likely plan for IV iron infusion next week at the patient's earliest convenience.   We will plan to see the patient back in approximately 3 months for repeat iron studies

## 2021-10-26 NOTE — PROGRESS NOTES
No issues. Cx 1-2/70%. Iron infusions starting tomorrow. NST today. RTC 1 wk. Labor instructions provided.

## 2021-10-27 NOTE — TELEPHONE ENCOUNTER
Called Chanelle and let her know that new orders for iron infusion have been placed. We will obtain authorization form insurance and then get her scheduled for infusion. Pt states she is feeling better now. No further questions at present.

## 2021-10-27 NOTE — PROGRESS NOTES
Asked to see patient in infusion center. Pt is receiving test dose of iron dextran. C/o runny nose and tearing within 2 minutes of test dose. Then proceeded to a cough and mild course feeling to chest with coughing.  Lungs were clear with auscultation- no w

## 2021-10-27 NOTE — TELEPHONE ENCOUNTER
To [address] on-call. Please advise on further recs, since did not complete iron infusion. See message below.

## 2021-10-27 NOTE — TELEPHONE ENCOUNTER
[id number]. Pt states she was informed to have an iron infusion. She met Dr. Haleigh Otoole was to have an iron infusion today. They did a test iron infusion today and within 10 minutes she had watery eyes, itching nose and coughing, allergic reaction.  They did not

## 2021-10-27 NOTE — PROGRESS NOTES
Pt to infusion for Infed infusion for anemia in pregnancy. Pt due in 3 weeks with 5th child. Reports fatigue, otherwise feeling well. Discussed administration and possible s/e with pt, who verbalized understanding.   PIV x1 attempt to L FA.  0825-Test dose

## 2021-10-27 NOTE — TELEPHONE ENCOUNTER
Pt states she was suppose to have iron infusion today and had an allergic reaction so was unable to have it done, states hematologist is on vacation the next few weeks, pt was told should have it done before she delivers.  Please advise

## 2021-10-28 NOTE — NST
Nonstress Test   Patient: Leora Childers    Gestation: 37w3d    Diagnosis from order: Multigravida of advanced maternal age in third trimester       NST:         NST DOCUMENTATION 10/26/2021   Variability 6-25 BPM   Accelerations Yes   Decelerations None

## 2021-10-28 NOTE — TELEPHONE ENCOUNTER
Ravindra Rojas reports that she feels good today. No lingering side effects from yesterday. I told her I would update Dr To Lux and DEE DEE Deluna.

## 2021-10-28 NOTE — TELEPHONE ENCOUNTER
I attempted to reach Chanelle to see how she is feeling after her reaction to her iron dextran test dose yesterday. No answer. I left a voice mail message asking her to please call the office at her earliest convenience.

## 2021-11-03 NOTE — PROGRESS NOTES
Pt to infusion for Injectafer 1/2 after failed attempt due to HSR  with Iron Dextran  for anemia in pregnancy. Pt due in 3 weeks with 5th child. Reports fatigue, otherwise feeling well.  Review  administration and possible s/e with pt, who verbalized unders

## 2021-11-09 NOTE — TELEPHONE ENCOUNTER
Pt is 39w2d and noticed some brownish discharge today. Pt states it is not fluid leaking. She was having contractions earlier today at her NST, but they have resolved. Pt reassured that blood/brown tinged discharge is no abnormal.  Pt instructed to call if it turns bright red or becomes a flow, if she develops leaking of fluid or if she has contractions every 10 minutes for an hour. Pt agrees with plan. Message to [employer] for further recs and sign off.

## 2021-11-09 NOTE — TELEPHONE ENCOUNTER
Pt is 39w2d, states she noticed brown/ blood discharge, is having contractions but not consistent.  Please advise

## 2021-11-12 NOTE — NST
Nonstress Test   Patient: Vishnu Childers    Gestation: 39w4d    Diagnosis from order: Multigravida of advanced maternal age in third trimester       NST:         NST DOCUMENTATION 11/9/2021   Variability 6-25 BPM   Accelerations Yes   Decelerations None

## 2021-11-12 NOTE — PROGRESS NOTES
Pt to infusion for Injectafer #2 of 2 Injectafer doses. Pt due in 2 days with 5th child. States she is feeling well. No adverse reaction after her last injectafer dose. PIV placed right forearm. Excellent blood return.    Injectafer administered and t

## 2021-11-14 NOTE — PROGRESS NOTES
Pt is a 28year old female admitted to [place] [address]. Patient presents with:  R/o Labor     Pt is [id number] 40w0d intra-uterine pregnancy. History obtained, consents signed. Oriented to room, staff, and plan of care.

## 2021-11-14 NOTE — DISCHARGE SUMMARY
[facility]    Discharge Summary    Estephaniesergio Muñizlaurent Childers Patient Status:  Inpatient    1985 MRN [id number]   Location [address] Attending Guzman Gunderson, [address] [facility] Day # 1       Delivering OB Clinician: FANY

## 2021-11-14 NOTE — H&P
45  Riddhi & Opal Childers Patient Status:  Inpatient    1985 MRN [id number]   Location [address] Attending Adelia Ruiz, [address] [facility] Day # 0 PCP Wenceslao Cohen MD     D unknown            H/o of irregular cycles. Unknown LMP. Plan to US            for dates. Prior pregnancy complicated by IUGR, antepartum, first trimester            Last two deliveries with IUGR infant.  Will have            MFM consult due to [place] time      Allergies:   Callery                  HIVES, ITCHING  Peanuts [Peanut Oil]    SWELLING  Iron Dextran            Coughing, ITCHING  Latex                   RASH    OBJECTIVE:    Pulse:  [] 120  Resp:  [20] 20  BP: (101-106)/(62-73) 106/73

## 2021-11-14 NOTE — PROGRESS NOTES
Patient received into room 370 per wheelchair. Ambulated to the bed with standby assistance. Orientated to room and visiting hours. Father of baby at bedside. Assessment WDL. See flowsheets. Infant rooming in. Condition stable at this time.

## 2021-11-14 NOTE — L&D DELIVERY NOTE
[facility]    Vaginal Delivery Note    Andriy Hardy Patient Status:  Inpatient    1985 MRN [id number]   Location [address] Attending Lincoln Rivero, [address] [facility] Day # 0 PCP Hannah Goldberg MD

## 2021-11-15 NOTE — PROGRESS NOTES
[facility]    OB/Gyne Post  Progress Note      Bisi Childers Patient Status:  Inpatient    1985 MRN [id number]   Location [facility] [address] Attending [address], [address] [facility] Day # 1 PCP Nataliia Roberson MD found.      Assessment/Plan   28year oldyo [id number] , s/p spontaneous vaginal, PPD# 1   Patient Active Problem List:     Prior pregnancy complicated by IUGR, antepartum, first trimester     AMA (advanced maternal age) multigravida 35+     Date of last mens

## 2021-11-15 NOTE — CM/SW NOTE
SW self referral due to finances/WIC resources    SW met with patient and FOB  bedside. SW confirmed face sheet contact as correct.     Baby boy/girl name: Baby boy Alona Dixon  Date & time of delivery: 11/14/21 @ 6:48am  Delivery method: Normal spontaneous vagina

## 2021-11-18 NOTE — TELEPHONE ENCOUNTER
B/P is 100/50 from today. (See notes below also.)      Pt delivered 11/14, NSVED. With [address] Denies at the present dizziness, chest pain, blurred vision or dizziness. Informed pt drink at least 64 oz of water a day. Pt has a headache last few days.   She

## 2021-11-18 NOTE — TELEPHONE ENCOUNTER
Informed pt that Nya Yan agrees with JLK. Informed pt that she needs hydration, tylenol or motrin. Informed pt to take the medication as prescribed on the bottle. Informed pt that these are safe with breastfeeding. Informed pt to try to get sleep.     Informed

## 2021-11-18 NOTE — TELEPHONE ENCOUNTER
Can take either tylenol or motrin q 6 hrs. Add caffeine.   Take BP at home or can come to office to make sure BP is ok

## 2021-11-18 NOTE — TELEPHONE ENCOUNTER
Pt delivered 11/14  Pt noticed the last few days she is having headaches which are not going away wit ibuprofen.     Please advise

## 2021-11-18 NOTE — TELEPHONE ENCOUNTER
Agree with DR BAUTISTA. Hydrataion, tylenol or motrin. These are safe with breastfeeding. She should also see If she can get some sleep; sounds like sleep deprivation.

## 2021-11-18 NOTE — TELEPHONE ENCOUNTER
Spoke with patient and discussed recs. Patient verbalized understanding. She will check BP now and send mychart with numbers.

## 2021-11-18 NOTE — TELEPHONE ENCOUNTER
Patient delivered 11/14. Calling with HA x 3 days. Rated 2/10, mild but lingering, dull forehead pressure. Denies swelling of face and hands, ankles are swollen B/L. Denies SOB/CP, upper abdominal pain, blurred vision or dizziness.  BP on discharge was 94/5

## 2021-11-19 NOTE — TELEPHONE ENCOUNTER
Patient calling to follow-up. Her blood pressure at home today was 130/70. She is experiencing mild neck pain and HA both rated 1.5/10, improved with motrin. Denies swelling to face or hands, does have swelling in ankles.  Denies blurred vision, CP/SOB, di

## 2021-11-19 NOTE — PROGRESS NOTES
Pt delivered on 2021 . Present to office today for a BP check. Pt states she purchased a BP cuff and noted her BP at home this morning was 130/70. Pt also states she has had a HA for several days, pt indicates I goes from sharp to dull.  Pt states

## 2021-11-23 NOTE — TELEPHONE ENCOUNTER
DEL 2021 . Pt states she has a sever HA that is stabbing pain that is worsening as the day goes on. Pt states pain is a 10/10. Pt states she has attempted to take Tylenol and it doesn't even \"take the edge off\".  Pt states pain has her nauseate

## 2021-11-24 NOTE — ED INITIAL ASSESSMENT (HPI)
Patient sent for r/o preeclampsia. Patient delivered on . . Patient has headache that began a few days ago and began worsening today. Patients blood pressure at home was 140/86. Patient took tylenol at home with no relief.

## 2021-11-24 NOTE — ED PROVIDER NOTES
Patient Seen in: [facility] Emergency Department    History   Patient presents with:  Hypertension  R/o Pih      HPI    The patient presents to the ED complaining of a diffuse headache over the past few days that started gradually but is now sever Substance and Sexual Activity      Alcohol use: No      Drug use: No    Other Topics      Concerns:        Caffeine Concern: No      ROS  Pertinent positives: Headache, high blood pressure  All other organ systems are reviewed and are negative.     Constitu Behavior: Behavior normal.         ED Course        Labs Reviewed   BASIC METABOLIC PANEL (8) - Abnormal; Notable for the following components:       Result Value    Creatinine 0.53 (*)     BUN/CREA Ratio 20.8 (*)     Calcium, Total 8.0 (*)     All other c SULFATE THERAPY)   MAGNESIUM (MG SULFATE THERAPY)   MAGNESIUM (MG SULFATE THERAPY)   MAGNESIUM (MG SULFATE THERAPY)   COMP METABOLIC PANEL (14)   CBC WITH DIFFERENTIAL WITH PLATELET   URINE CULTURE, ROUTINE     EKG    Rate, intervals and axes as noted on E of this ED evaluation. ED Course: The patient presents to the ED with symptoms concerning for preeclampsia including hypertension at home and progressive headache over days at home.   Laboratory testing with elevated  AST and ALT, elevated LDH and elevat

## 2021-11-24 NOTE — PAYOR COMM NOTE
--------------  ADMISSION REVIEW     Payor: Robinson Jordan #:  [id number]  Authorization Number: [id number]    Admit date: 11/23/21  Admit time:  9:40 PM       REVIEW DOCUMENTATION:     ED Provider Notes      ED Provi (VITAMIN C) 1000 MG Oral Tab, Take 1,000 mg by mouth daily. , Disp: , Rfl: , 11/22/2021 at Unknown time  PRENATAL 27-0.8 MG Oral Tab, Take 1 tablet by mouth daily. , Disp: , Rfl: , 11/22/2021 at Unknown time         Family History   Problem Relation Age of O Trachea: No tracheal deviation. Cardiovascular:      Rate and Rhythm: Normal rate. Pulmonary:      Effort: Pulmonary effort is normal. No respiratory distress. Breath sounds: No stridor. Abdominal:      General: There is no distension.       Palp Differential With Platelet.                   Procedure                               Abnormality         Status                                     ---------                               -----------         ------                                     CBC W records for any recent pertinent discharge summaries, testing, and procedures and reviewed those reports. Complicating Factors: The patient already has does not have any pertinent problems on file. to contribute to the complexity of this ED evaluation. 12/25/1985 MRN [id number]   Location [address] [place] [address] Attending Willis Pinzon MD   [facility] Day # 1 PCP Lana Rangel MD     Date of Admission:  11/23/2021    HPI:   Reason for Admission:  Postpartum preeclampsia    History of Pr Past OB History:  OB History    Para Term  AB Living   6 5 5   1 5   SAB IAB Ectopic Multiple Live Births   1     0 5      # Outcome Date GA Lbr Demetrius/2nd Weight Sex Delivery Anes PTL Lv   6 Term 21 40w0d 02:38 / 00:10 7 lb 5.5 oz ( 11 7 - 18 mg/dL    Creatinine 0.53 (L) 0.55 - 1.02 mg/dL    BUN/CREA Ratio 20.8 (H) 10.0 - 20.0    Calcium, Total 8.0 (L) 8.5 - 10.1 mg/dL    Calculated Osmolality 289 275 - 295 mOsm/kg    GFR, Non- 123 >=60    GFR, -American 142 >=6 80.0 - 100.0 fL    MCH 28.2 26.0 - 34.0 pg    MCHC 31.9 31.0 - 37.0 g/dL    RDW-SD 62.1 (H) 35.1 - 46.3 fL    RDW 19.6 (H) 11.0 - 15.0 %    .0 150.0 - 450.0 10(3)uL    Neutrophil Absolute Prelim 2.73 1.50 - 7.70 x10 (3) uL    Neutrophil Absolute 2.7 diphenhydrAMINE (BENADRYL) IV PUSH injection 25 mg     Date Action Dose Route User    11/23/2021 1920 Given 25 mg Intravenous Cassandra Price RN      Ketorolac Tromethamine (TORADOL) injection 15 mg     Date Action Dose Route User    11/23/2021 1920 Gi 11/24/21 0400 — 60 18 95/58 99 % — None (Room air) — ML    11/24/21 0300 — 59 14 90/57 98 % — None (Room air) — ML    11/24/21 0200 — 65 16 100/63 99 % — None (Room air) — ML    11/24/21 0100 — 57 12 87/49 96 % — None (Room air) — ML    11/24/21 0000 — 54

## 2021-11-24 NOTE — H&P
[address] [facility] [email or web address] Patient Status:  Inpatient    1985 MRN [id number]   Location [address]  Attending Argentina Huynh MD   Hosp Day # 1 PCP Nataliia Roberson MD Frequent       Past Surgical History:  Past Surgical History:   Procedure Laterality Date   •          Past OB History:  OB History    Para Term  AB Living   6 5 5   1 5   SAB IAB Ectopic Multiple Live Births   1     0 5      # Outcome mmol/L    Chloride 111 98 - 112 mmol/L    CO2 23.0 21.0 - 32.0 mmol/L    Anion Gap 6 0 - 18 mmol/L    BUN 11 7 - 18 mg/dL    Creatinine 0.53 (L) 0.55 - 1.02 mg/dL    BUN/CREA Ratio 20.8 (H) 10.0 - 20.0    Calcium, Total 8.0 (L) 8.5 - 10.1 mg/dL    Calculat RBC 3.90 3.80 - 5.30 x10(6)uL    HGB 11.0 (L) 12.0 - 16.0 g/dL    HCT 34.5 (L) 35.0 - 48.0 %    MCV 88.5 80.0 - 100.0 fL    MCH 28.2 26.0 - 34.0 pg    MCHC 31.9 31.0 - 37.0 g/dL    RDW-SD 62.1 (H) 35.1 - 46.3 fL    RDW 19.6 (H) 11.0 - 15.0 %    .0 1

## 2021-11-25 NOTE — DISCHARGE SUMMARY
[facility]  Discharge Summary    Leslye Childers Patient Status:  Inpatient    1985 MRN [id number]   Location [address] Attending Ray Pino MD   Hosp Day # 2 PCP Nnamdi Wilson MD           D

## 2021-11-25 NOTE — PROGRESS NOTES
Discharged to home per wheelchair  in stable condition with written and verbal instructions. Patient verbalizes understanding of information given.

## 2021-11-25 NOTE — TELEPHONE ENCOUNTER
Patient admitted for PP pre-eclampsia and discharged today. She is bringing baby for 2 week visit on 11-30. Can you contact her to schedule RN BP check at same time. Remind her that I placed order for repeat CMP to be drawn same day. Thank you.

## 2021-11-25 NOTE — PROGRESS NOTES
[facility] HOSP - [facility]    OB/GYNE Progress Note      Rufina Childers Patient Status:  Inpatient    1985 MRN [id number]   Location [facility] Attending Darling Villalta MD   Hosp Day # 2 PCP Alexandra Rose MD 11/25/2021    K 4.1 11/25/2021     11/25/2021    CO2 27.0 11/25/2021    GLU 76 11/25/2021    CA 7.0 (L) 11/25/2021    ALB 2.3 (L) 11/25/2021    ALKPHO 130 (H) 11/25/2021    BILT 0.3 11/25/2021    TP 5.5 (L) 11/25/2021    AST 58 (H) 11/25/2021    ALT

## 2021-11-26 NOTE — PAYOR COMM NOTE
--------------  DISCHARGE REVIEW    Payor: Robinson Jordan #:  [id number]  Authorization Number: [id number]    Admit date: 11/23/21  Admit time:   9:40 PM  Discharge Date: 11/25/2021 11:29 AM     Admitting Physician: Keyana Lozano mouth daily. PRENATAL 27-0.8 MG Oral Tab  Take 1 tablet by mouth daily. Follow up Visits: Follow-up in 5 days for Nurse BP check and then in 1 month with Dr Angela Pringle for postpartum visit.        Other Discharge Instructions: Call jonelle blanco

## 2021-11-26 NOTE — TELEPHONE ENCOUNTER
On call--PP pt DCed today after readmission for severe preE. S/p mag sulfate x 24 hours. Pt with HA. Checked BP and 130s/70s. Encouraged motrin as LFTs were elevated in hospital.  Rest and hydration also encouraged.   Please check in on pt tomorrow for

## 2021-11-26 NOTE — TELEPHONE ENCOUNTER
Patient notified of recs from [address] [place] [address] and JANIS (see other 11/25 encounter). She took BP while on phone this morning- 116/79. Denies symptoms aside from a dull ache behind her ear. She is going to take motrin and will let us know if not resolving.    Scheduled pa

## 2021-11-30 NOTE — PROGRESS NOTES
Pt delivered  on  and was readmitted to hospital on  for Pre-eclampsia and magnesium sulfate. Pt verified, urine sample, WT and VS obtained. Pt denies any HA, blurry vision or Epigastric pain.  Pt does states she has some tender in the RLQ, st

## 2021-12-01 NOTE — TELEPHONE ENCOUNTER
Breast pump order received via fax from [address]. Form completed and faxed back to [address]

## 2021-12-13 NOTE — PROGRESS NOTES
Pt presents to clinic for B/P check. Pt delivered  on 21. Pt was admitted on 21 for Preeclampsia and Magnesium sulfate. Pt is not on b/p meds. Pt reports she paged on call last evening with c/o elevated b/p's in 705'W and 744'U systolic.

## 2021-12-16 NOTE — PROGRESS NOTES
Subjective:     Patient ID: Yulisa Dick is a 28year old female. Blood Pressure  This is a new problem. The current episode started 1 to 4 weeks ago. The problem occurs intermittently. The problem has been gradually improving.  Associated symptoms inc 325 (65 FE) MG Oral Tab EC Take 325 mg by mouth 2 (two) times daily with meals. • Ascorbic Acid (VITAMIN C) 1000 MG Oral Tab Take 1,000 mg by mouth daily. • PRENATAL 27-0.8 MG Oral Tab Take 1 tablet by mouth daily.        Allergies:  Chery Jose soft. There is no mass. Tenderness: There is no abdominal tenderness. There is no guarding or rebound. Musculoskeletal:         General: No tenderness. Normal range of motion. Cervical back: Normal range of motion and neck supple. No rigidity.

## 2021-12-20 NOTE — TELEPHONE ENCOUNTER
Notified pt of result and CAP message below. Pt reports she is still having some pain when she urinates. States she thinks it is getting better. Instructed pt on returning to lab for urine cx. Pt agrees.

## 2021-12-20 NOTE — TELEPHONE ENCOUNTER
----- Message from James Flores MD sent at 12/19/2021  6:54 PM CST -----  Please call pt and see if she is still having UTI sx. If she is then please have her leave a urine culture specimen.   UA is not clear on whether there is a UTI or not

## 2021-12-27 NOTE — H&P
NOÉ    Negro Bucio is a 39year old female [id number] here for 6 week post-partum visit. Patient delivered a  male infant on 21 via . Patient desires POPs for contraception;  considering conception and pt may also want IUD until then.   P 1 tablet (600 mg total) by mouth every 6 (six) hours as needed for Pain., Disp: 20 tablet, Rfl: 0  •  docusate sodium 100 MG Oral Cap, Take 100 mg by mouth 2 (two) times daily as needed for constipation. , Disp: 30 capsule, Rfl: 0  •  acetaminophen 500 MG O for IUDs also given. R/b/use/SE/efficacy/insertion all reviewed. Will need serum hcg day before if not on POPs   Patient to return for annual gyne exam in May 2022.

## 2022-01-12 NOTE — PROGRESS NOTES
Subjective:     Patient ID: Elie Deal is a 39year old female. Blood Pressure  This is a new problem. The current episode started more than 1 month ago (postpregnancy). The problem occurs constantly. The problem has been gradually improving.  Pertin Diabetes Father    • Lipids Father    • Hypertension Father    • Lipids Mother       Social History: Social History    Tobacco Use      Smoking status: Never Smoker      Smokeless tobacco: Never Used    Alcohol use: No    Drug use: No       Objective:   Ph baseline bp; continue to monitor bp at home.  No treatment needed at this time.     (R72.89) Elevated LFTs  Plan: ACTIN (SMOOTH MUSCLE) ANTIBODY,         ALPHA-1-ANTITRYPSIN, SERUM, CERULOPLASMIN,         TISSUE TRANSGLUTAMINASE AB IGA, US LIVER         (CP

## 2022-03-17 ENCOUNTER — OFFICE VISIT (OUTPATIENT)
Dept: GASTROENTEROLOGY | Facility: CLINIC | Age: 37
End: 2022-03-17
Payer: MEDICAID

## 2022-03-17 VITALS
DIASTOLIC BLOOD PRESSURE: 71 MMHG | SYSTOLIC BLOOD PRESSURE: 102 MMHG | HEART RATE: 77 BPM | HEIGHT: 61 IN | BODY MASS INDEX: 21.52 KG/M2 | WEIGHT: 114 LBS

## 2022-03-17 DIAGNOSIS — K59.00 CONSTIPATION, UNSPECIFIED CONSTIPATION TYPE: ICD-10-CM

## 2022-03-17 DIAGNOSIS — R10.11 RUQ PAIN: ICD-10-CM

## 2022-03-17 DIAGNOSIS — R14.0 BLOATING: ICD-10-CM

## 2022-03-17 DIAGNOSIS — R74.8 ELEVATED LIVER ENZYMES: Primary | ICD-10-CM

## 2022-03-17 PROCEDURE — 3078F DIAST BP <80 MM HG: CPT | Performed by: NURSE PRACTITIONER

## 2022-03-17 PROCEDURE — 3008F BODY MASS INDEX DOCD: CPT | Performed by: NURSE PRACTITIONER

## 2022-03-17 PROCEDURE — 3074F SYST BP LT 130 MM HG: CPT | Performed by: NURSE PRACTITIONER

## 2022-03-17 PROCEDURE — 99244 OFF/OP CNSLTJ NEW/EST MOD 40: CPT | Performed by: NURSE PRACTITIONER

## 2022-04-03 ENCOUNTER — HOSPITAL ENCOUNTER (EMERGENCY)
Facility: HOSPITAL | Age: 37
Discharge: ANOTHER HOSPITAL | End: 2022-04-03

## 2022-04-03 ENCOUNTER — HOSPITAL ENCOUNTER (EMERGENCY)
Facility: HOSPITAL | Age: 37
Discharge: HOME | End: 2022-04-03
Attending: EMERGENCY MEDICINE
Payer: MEDICAID

## 2022-04-03 ENCOUNTER — TELEPHONE (OUTPATIENT)
Dept: INTERNAL MEDICINE CLINIC | Facility: CLINIC | Age: 37
End: 2022-04-03

## 2022-04-03 VITALS
TEMPERATURE: 97 F | SYSTOLIC BLOOD PRESSURE: 113 MMHG | BODY MASS INDEX: 20.77 KG/M2 | DIASTOLIC BLOOD PRESSURE: 64 MMHG | HEIGHT: 61 IN | RESPIRATION RATE: 18 BRPM | HEART RATE: 71 BPM | WEIGHT: 110 LBS | OXYGEN SATURATION: 98 %

## 2022-04-03 DIAGNOSIS — S06.0X0A CONCUSSION WITHOUT LOSS OF CONSCIOUSNESS, INITIAL ENCOUNTER: Primary | ICD-10-CM

## 2022-04-03 PROCEDURE — 99283 EMERGENCY DEPT VISIT LOW MDM: CPT

## 2022-04-03 RX ORDER — IBUPROFEN 600 MG/1
600 TABLET ORAL EVERY 8 HOURS PRN
Qty: 30 TABLET | Refills: 0 | Status: SHIPPED | OUTPATIENT
Start: 2022-04-03 | End: 2022-04-10

## 2022-04-03 RX ORDER — ONDANSETRON 4 MG/1
4 TABLET, ORALLY DISINTEGRATING ORAL EVERY 4 HOURS PRN
Qty: 10 TABLET | Refills: 0 | Status: SHIPPED | OUTPATIENT
Start: 2022-04-03 | End: 2022-04-10

## 2022-04-03 NOTE — TELEPHONE ENCOUNTER
Patient white patient. She hit the back of her head with the door of car earlier. Initially felt no symptoms but now feeling tired and feeling somewhat lethargic. No syncope, no nausea vomiting. Did have a contusion on her head. Denies having any other neurologic symptoms. I advised him to go to ER right away so she could be evaluated may need to have a scan of her brain given her complaint.   She agreed and will go to ER right away

## 2022-04-03 NOTE — ED INITIAL ASSESSMENT (HPI)
Pt to the ed after hitting her head on the trunk of the car. She reports feeling groggy and having a slight headache. Denies visual disturbances or nausea.

## 2022-04-03 NOTE — ED QUICK NOTES
Patient safe to DC home per MD. Jerry Martin to dress self. DC teaching done, instructions reviewed with patient including when and how to follow up with healthcare providers and when to seek emergency care. The patient verbalizes understanding. Patient ambulatory with steady gait to exit.

## 2022-06-14 ENCOUNTER — HOSPITAL ENCOUNTER (OUTPATIENT)
Dept: ULTRASOUND IMAGING | Age: 37
Discharge: HOME | End: 2022-06-14
Attending: NURSE PRACTITIONER
Payer: MEDICAID

## 2022-06-14 DIAGNOSIS — R14.0 BLOATING: ICD-10-CM

## 2022-06-14 DIAGNOSIS — K59.00 CONSTIPATION, UNSPECIFIED CONSTIPATION TYPE: ICD-10-CM

## 2022-06-14 DIAGNOSIS — R10.11 RUQ PAIN: ICD-10-CM

## 2022-06-14 DIAGNOSIS — R74.8 ELEVATED LIVER ENZYMES: ICD-10-CM

## 2022-06-14 PROCEDURE — 76700 US EXAM ABDOM COMPLETE: CPT | Performed by: NURSE PRACTITIONER

## 2022-06-15 ENCOUNTER — TELEPHONE (OUTPATIENT)
Dept: GASTROENTEROLOGY | Facility: CLINIC | Age: 37
End: 2022-06-15

## 2022-06-15 DIAGNOSIS — K82.4 GALLBLADDER POLYP: Primary | ICD-10-CM

## 2022-06-15 NOTE — TELEPHONE ENCOUNTER
Entered into Epic. Recall US gallbladder in 6 months per Henny Garcia, aprn. 2/2 gallbladder polyp surveillance. Last scan done 06/14/2022. Recall entered into Patient Outreach for 12/14/2022.

## 2022-06-15 NOTE — TELEPHONE ENCOUNTER
She denies fhx gb disease/ca. She denies upper abd pain. Advise repeat labs(ordered previously)  Repeat ultrasound 6 mos for surveillance. Consider surgery referral for ccy if indicated pending results/pt symptoms.     She verbalizes understanding of above and is in agreement with plan

## 2022-06-23 ENCOUNTER — OFFICE VISIT (OUTPATIENT)
Dept: OBGYN CLINIC | Facility: CLINIC | Age: 37
End: 2022-06-23
Payer: MEDICAID

## 2022-06-23 VITALS
HEART RATE: 64 BPM | BODY MASS INDEX: 21 KG/M2 | SYSTOLIC BLOOD PRESSURE: 110 MMHG | WEIGHT: 112 LBS | DIASTOLIC BLOOD PRESSURE: 73 MMHG

## 2022-06-23 DIAGNOSIS — Z01.419 WELL WOMAN EXAM: Primary | ICD-10-CM

## 2022-06-23 PROCEDURE — 3074F SYST BP LT 130 MM HG: CPT | Performed by: OBSTETRICS & GYNECOLOGY

## 2022-06-23 PROCEDURE — 3078F DIAST BP <80 MM HG: CPT | Performed by: OBSTETRICS & GYNECOLOGY

## 2022-06-23 PROCEDURE — 99395 PREV VISIT EST AGE 18-39: CPT | Performed by: OBSTETRICS & GYNECOLOGY

## 2022-08-02 ENCOUNTER — HOSPITAL ENCOUNTER (OUTPATIENT)
Age: 37
Discharge: HOME | End: 2022-08-02
Payer: MEDICAID

## 2022-08-02 VITALS
DIASTOLIC BLOOD PRESSURE: 67 MMHG | OXYGEN SATURATION: 100 % | SYSTOLIC BLOOD PRESSURE: 115 MMHG | TEMPERATURE: 98 F | HEART RATE: 62 BPM | RESPIRATION RATE: 20 BRPM

## 2022-08-02 DIAGNOSIS — N30.00 ACUTE CYSTITIS WITHOUT HEMATURIA: ICD-10-CM

## 2022-08-02 DIAGNOSIS — R42 DIZZINESS: ICD-10-CM

## 2022-08-02 DIAGNOSIS — E86.0 DEHYDRATION: ICD-10-CM

## 2022-08-02 DIAGNOSIS — R30.0 DYSURIA: Primary | ICD-10-CM

## 2022-08-02 LAB
#MXD IC: 0.7 X10ˆ3/UL (ref 0.1–1)
B-HCG UR QL: NEGATIVE
BILIRUB UR QL STRIP: NEGATIVE
BUN BLD-MCNC: 13 MG/DL (ref 7–18)
CHLORIDE BLD-SCNC: 103 MMOL/L (ref 98–112)
CLARITY UR: CLEAR
CO2 BLD-SCNC: 25 MMOL/L (ref 21–32)
COLOR UR: YELLOW
CREAT BLD-MCNC: 0.6 MG/DL
GFR SERPLBLD BASED ON 1.73 SQ M-ARVRAT: 119 ML/MIN/1.73M2 (ref 60–?)
GLUCOSE BLD-MCNC: 95 MG/DL (ref 70–99)
GLUCOSE UR STRIP-MCNC: NEGATIVE MG/DL
HCT VFR BLD AUTO: 36.7 %
HCT VFR BLD CALC: 40 %
HGB BLD-MCNC: 12 G/DL
ISTAT IONIZED CALCIUM FOR CHEM 8: 1.2 MMOL/L (ref 1.12–1.32)
KETONES UR STRIP-MCNC: NEGATIVE MG/DL
LYMPHOCYTES # BLD AUTO: 2.1 X10ˆ3/UL (ref 1–4)
LYMPHOCYTES NFR BLD AUTO: 33.2 %
MCH RBC QN AUTO: 29.7 PG (ref 26–34)
MCHC RBC AUTO-ENTMCNC: 32.7 G/DL (ref 31–37)
MCV RBC AUTO: 90.8 FL (ref 80–100)
MIXED CELL %: 11.1 %
NEUTROPHILS # BLD AUTO: 3.5 X10ˆ3/UL (ref 1.5–7.7)
NEUTROPHILS NFR BLD AUTO: 55.7 %
NITRITE UR QL STRIP: NEGATIVE
PH UR STRIP: 7 [PH]
PLATELET # BLD AUTO: 215 X10ˆ3/UL (ref 150–450)
POTASSIUM BLD-SCNC: 3.6 MMOL/L (ref 3.6–5.1)
PROT UR STRIP-MCNC: NEGATIVE MG/DL
RBC # BLD AUTO: 4.04 X10ˆ6/UL
SODIUM BLD-SCNC: 139 MMOL/L (ref 136–145)
SP GR UR STRIP: 1.02
UROBILINOGEN UR STRIP-ACNC: <2 MG/DL
WBC # BLD AUTO: 6.3 X10ˆ3/UL (ref 4–11)

## 2022-08-02 PROCEDURE — 85025 COMPLETE CBC W/AUTO DIFF WBC: CPT | Performed by: NURSE PRACTITIONER

## 2022-08-02 PROCEDURE — 80047 BASIC METABLC PNL IONIZED CA: CPT | Performed by: NURSE PRACTITIONER

## 2022-08-02 PROCEDURE — 93000 ELECTROCARDIOGRAM COMPLETE: CPT | Performed by: NURSE PRACTITIONER

## 2022-08-02 PROCEDURE — 81025 URINE PREGNANCY TEST: CPT | Performed by: NURSE PRACTITIONER

## 2022-08-02 PROCEDURE — 99214 OFFICE O/P EST MOD 30 MIN: CPT | Performed by: NURSE PRACTITIONER

## 2022-08-02 PROCEDURE — 81002 URINALYSIS NONAUTO W/O SCOPE: CPT | Performed by: NURSE PRACTITIONER

## 2022-08-02 RX ORDER — CEPHALEXIN 500 MG/1
500 CAPSULE ORAL 2 TIMES DAILY
Qty: 14 CAPSULE | Refills: 0 | Status: SHIPPED | OUTPATIENT
Start: 2022-08-02 | End: 2022-08-09

## 2022-08-03 NOTE — ED INITIAL ASSESSMENT (HPI)
Pt states earlier today was cleaning in the kitchen became light headed and shaky pt states thought it may have been low BS and thought eating would help. Pt states even after eating still felt dizzy. Pt states also having right side pain. Pt states has had similar symptoms with a UTI in the past. Pt states recent birth in nov and is still breast feeding.

## 2022-11-25 ENCOUNTER — TELEPHONE (OUTPATIENT)
Facility: CLINIC | Age: 37
End: 2022-11-25

## 2022-11-25 NOTE — TELEPHONE ENCOUNTER
Message sent to patient informing her she is due for repeat imaging. US order already in place. Patient viewed message. Last read by Massimo Childers at 11:33 AM on 11/25/2022.

## 2022-11-25 NOTE — TELEPHONE ENCOUNTER
Patient outreach message received:    Recall US gallbladder in 6 months per rohini Nye. 2/2 gallbladder polyp surveillance. Last scan done 06/14/2022. Recall entered into Patient Outreach for 12/14/2022.

## 2023-01-06 ENCOUNTER — APPOINTMENT (OUTPATIENT)
Dept: GENERAL RADIOLOGY | Facility: HOSPITAL | Age: 38
End: 2023-01-06
Attending: EMERGENCY MEDICINE
Payer: MEDICAID

## 2023-01-06 ENCOUNTER — HOSPITAL ENCOUNTER (EMERGENCY)
Facility: HOSPITAL | Age: 38
Discharge: HOME | End: 2023-01-06
Attending: EMERGENCY MEDICINE
Payer: MEDICAID

## 2023-01-06 ENCOUNTER — APPOINTMENT (OUTPATIENT)
Dept: CT IMAGING | Facility: HOSPITAL | Age: 38
End: 2023-01-06
Attending: EMERGENCY MEDICINE
Payer: MEDICAID

## 2023-01-06 VITALS
OXYGEN SATURATION: 98 % | HEIGHT: 61 IN | WEIGHT: 130 LBS | DIASTOLIC BLOOD PRESSURE: 63 MMHG | BODY MASS INDEX: 24.55 KG/M2 | RESPIRATION RATE: 16 BRPM | HEART RATE: 77 BPM | TEMPERATURE: 99 F | SYSTOLIC BLOOD PRESSURE: 106 MMHG

## 2023-01-06 DIAGNOSIS — R07.9 CHEST PAIN OF UNCERTAIN ETIOLOGY: Primary | ICD-10-CM

## 2023-01-06 LAB
ALBUMIN SERPL-MCNC: 3.9 G/DL (ref 3.4–5)
ALBUMIN/GLOB SERPL: 1.2 {RATIO} (ref 1–2)
ALP LIVER SERPL-CCNC: 68 U/L
ALT SERPL-CCNC: 18 U/L
ANION GAP SERPL CALC-SCNC: 5 MMOL/L (ref 0–18)
AST SERPL-CCNC: 12 U/L (ref 15–37)
BASOPHILS # BLD AUTO: 0.05 X10(3) UL (ref 0–0.2)
BASOPHILS NFR BLD AUTO: 0.9 %
BILIRUB SERPL-MCNC: 0.5 MG/DL (ref 0.1–2)
BUN BLD-MCNC: 17 MG/DL (ref 7–18)
BUN/CREAT SERPL: 21.3 (ref 10–20)
CALCIUM BLD-MCNC: 9 MG/DL (ref 8.5–10.1)
CHLORIDE SERPL-SCNC: 106 MMOL/L (ref 98–112)
CO2 SERPL-SCNC: 27 MMOL/L (ref 21–32)
CREAT BLD-MCNC: 0.8 MG/DL
D DIMER PPP FEU-MCNC: <0.27 UG/ML FEU (ref ?–0.5)
DEPRECATED RDW RBC AUTO: 41.8 FL (ref 35.1–46.3)
EOSINOPHIL # BLD AUTO: 0.16 X10(3) UL (ref 0–0.7)
EOSINOPHIL NFR BLD AUTO: 2.8 %
ERYTHROCYTE [DISTWIDTH] IN BLOOD BY AUTOMATED COUNT: 12.7 % (ref 11–15)
GFR SERPLBLD BASED ON 1.73 SQ M-ARVRAT: 97 ML/MIN/1.73M2 (ref 60–?)
GLOBULIN PLAS-MCNC: 3.3 G/DL (ref 2.8–4.4)
GLUCOSE BLD-MCNC: 103 MG/DL (ref 70–99)
HCT VFR BLD AUTO: 38.7 %
HGB BLD-MCNC: 12.5 G/DL
IMM GRANULOCYTES # BLD AUTO: 0.01 X10(3) UL (ref 0–1)
IMM GRANULOCYTES NFR BLD: 0.2 %
LYMPHOCYTES # BLD AUTO: 2.2 X10(3) UL (ref 1–4)
LYMPHOCYTES NFR BLD AUTO: 38.7 %
MCH RBC QN AUTO: 28.9 PG (ref 26–34)
MCHC RBC AUTO-ENTMCNC: 32.3 G/DL (ref 31–37)
MCV RBC AUTO: 89.4 FL
MONOCYTES # BLD AUTO: 0.42 X10(3) UL (ref 0.1–1)
MONOCYTES NFR BLD AUTO: 7.4 %
NEUTROPHILS # BLD AUTO: 2.85 X10 (3) UL (ref 1.5–7.7)
NEUTROPHILS # BLD AUTO: 2.85 X10(3) UL (ref 1.5–7.7)
NEUTROPHILS NFR BLD AUTO: 50 %
OSMOLALITY SERPL CALC.SUM OF ELEC: 288 MOSM/KG (ref 275–295)
PLATELET # BLD AUTO: 233 10(3)UL (ref 150–450)
POTASSIUM SERPL-SCNC: 3.6 MMOL/L (ref 3.5–5.1)
PROT SERPL-MCNC: 7.2 G/DL (ref 6.4–8.2)
RBC # BLD AUTO: 4.33 X10(6)UL
SODIUM SERPL-SCNC: 138 MMOL/L (ref 136–145)
TROPONIN I HIGH SENSITIVITY: 4 NG/L
TROPONIN I HIGH SENSITIVITY: 5 NG/L
WBC # BLD AUTO: 5.7 X10(3) UL (ref 4–11)

## 2023-01-06 PROCEDURE — 36415 COLL VENOUS BLD VENIPUNCTURE: CPT

## 2023-01-06 PROCEDURE — 80053 COMPREHEN METABOLIC PANEL: CPT | Performed by: EMERGENCY MEDICINE

## 2023-01-06 PROCEDURE — 71045 X-RAY EXAM CHEST 1 VIEW: CPT | Performed by: EMERGENCY MEDICINE

## 2023-01-06 PROCEDURE — 93010 ELECTROCARDIOGRAM REPORT: CPT

## 2023-01-06 PROCEDURE — 93005 ELECTROCARDIOGRAM TRACING: CPT

## 2023-01-06 PROCEDURE — 70498 CT ANGIOGRAPHY NECK: CPT | Performed by: EMERGENCY MEDICINE

## 2023-01-06 PROCEDURE — 85379 FIBRIN DEGRADATION QUANT: CPT | Performed by: EMERGENCY MEDICINE

## 2023-01-06 PROCEDURE — 84484 ASSAY OF TROPONIN QUANT: CPT | Performed by: EMERGENCY MEDICINE

## 2023-01-06 PROCEDURE — 85025 COMPLETE CBC W/AUTO DIFF WBC: CPT | Performed by: EMERGENCY MEDICINE

## 2023-01-06 PROCEDURE — 99285 EMERGENCY DEPT VISIT HI MDM: CPT

## 2023-01-06 RX ORDER — KETOROLAC TROMETHAMINE 15 MG/ML
15 INJECTION, SOLUTION INTRAMUSCULAR; INTRAVENOUS ONCE
Status: DISCONTINUED | OUTPATIENT
Start: 2023-01-06 | End: 2023-01-07

## 2023-01-06 RX ORDER — IBUPROFEN 600 MG/1
600 TABLET ORAL EVERY 8 HOURS PRN
Qty: 30 TABLET | Refills: 0 | Status: SHIPPED | OUTPATIENT
Start: 2023-01-06 | End: 2023-01-13

## 2023-01-07 LAB
ATRIAL RATE: 86 BPM
P AXIS: 47 DEGREES
P-R INTERVAL: 106 MS
Q-T INTERVAL: 364 MS
QRS DURATION: 70 MS
QTC CALCULATION (BEZET): 435 MS
R AXIS: 84 DEGREES
T AXIS: 38 DEGREES
VENTRICULAR RATE: 86 BPM

## 2023-01-07 NOTE — DISCHARGE INSTRUCTIONS
You ibuprofen and or Tylenol as needed for pain. Push fluids and get rest.    See primary care next week for follow-up appointment. Return to the ER if you develop worsening symptoms, weakness or numbness on one side your body, worsening or moving chest pain or pressure, difficulty breathing, fainting, or any emergent concerns.

## 2023-01-07 NOTE — ED INITIAL ASSESSMENT (HPI)
Brought by [facility] for sudden-onset right upper chest pain radiating to right side of neck starting ~ 1.5 hours ago. Tingling to right hand. States she was stressed by her 5 children at the time the pain started. Mild SOB.  No NVD or recent illness

## 2023-03-13 ENCOUNTER — TELEPHONE (OUTPATIENT)
Facility: CLINIC | Age: 38
End: 2023-03-13

## 2023-03-13 NOTE — TELEPHONE ENCOUNTER
1st overdue result reminder letter sent to patient via [employer] and mail.       US GALLBLADDER (BNU=[id number]) (Order #[id number]) on 6/15/22

## 2023-04-11 NOTE — TELEPHONE ENCOUNTER
Pt is at [facility] for blood draw.   She is requesting orders to be Fax to [phone]

## 2023-08-15 NOTE — TELEPHONE ENCOUNTER
Patient calling for sudden onset throbbing headache starting near her ear and going to her neck while she was carrying her son. She has a history of migraine which usually presents as throbbing around the temples. Felt slightly dizzy and checked her blood pressure which was 124/86 but states she usually runs 100/60. Denies blurred vision, trouble speaking, confusion, imbalance, weakness or other symptoms. Not currently dizzy. She has not tried anything for the pain yet. Recommended try ibuprofen and ice pack to neck and if no improvement or if pain becomes severe or blood pressure rising or she develops any of the above symptoms should go to ER for evaluation.

## 2024-02-06 NOTE — TELEPHONE ENCOUNTER
Action Requested: Summary for Provider     []  Critical Lab, Recommendations Needed  [] Need Additional Advice  []   FYI    []   Need Orders  [] Need Medications Sent to Pharmacy  []  Other     SUMMARY: Patient scheduled self for 2024 for numbness/tingling in right arm, has been occurring on and off for several months.  Denies known injury or whiplash.  Occasional pain in neck.  States has discussed this with Dr Melendez in past and thinks she may have a pinched nerve.  Denies facial numbness, denies weakness on one side of body.  Sometimes feels numbness and tingling in right fingers and feels pain in right axillary.    Offered sooner appointment with alternate provider but patient wishes to keep appointment with Dr Melendez.  Advised on supporting right arm at night with pillows to prevent twisting of limb.   Advised to call if she wants sooner appointment with alternate provider.  Patient stated understanding of options.    Reason for call: Acute \"pain and numbness on right side\"  (right arm)  Onset: several months    Spoke with patient, OBBBI verified.   Not in distress at time of call.     Reason for Disposition   Numbness or tingling in one or both hands is a chronic symptom (recurrent or ongoing problem lasting > 4 weeks)    Protocols used: Neurologic Deficit-A-OH

## 2024-02-06 NOTE — TELEPHONE ENCOUNTER
Left message to call back and [employer] message sent requesting a call back to discus further [1st attempt]    RN Triage - please check if patient read [employer] message, if not please make 2nd attempt to call

## 2024-02-06 NOTE — TELEPHONE ENCOUNTER
Patient scheduled appointment for the following.     concerns with pain and numbness on right side     Mychart message sent to give us a call back to speak with triage RN.

## 2024-02-07 NOTE — TELEPHONE ENCOUNTER
Left message to pt to call back.   Pt viewed Jelly[employer]et.         Read Composed From To  Subject   Y 2/6/2024 11:23 AM HERB Kurtz Luanne C  Please call our office

## 2024-02-22 NOTE — PROGRESS NOTES
Subjective:     Patient ID: Chanelle Childers is a 38 year old female.    Patient complained of having pain/tenderness on the right pectoral muscle/area for 2mos, had also right lower neck pain,  pt  states pain is like dull ache started on the lower part of the right pectoral muscle and down to the right arm. No weakness noted. Pain is random, not related to any activitiy, but tenderness is noted on putting pressure/palpation of the area.     Pain  This is a new (micaela on the right pectoral area, axilla) problem. The current episode started more than 1 month ago. The problem has been waxing and waning. Associated symptoms include coughing and neck pain. Pertinent negatives include no chest pain, joint swelling, numbness, rash or weakness. Nothing aggravates the symptoms. She has tried nothing for the symptoms. The treatment provided no relief.       History/Other:   Review of Systems   Constitutional: Negative.    Respiratory:  Positive for cough. Negative for shortness of breath and wheezing.    Cardiovascular:  Negative for chest pain and leg swelling.   Gastrointestinal: Negative.    Genitourinary: Negative.    Musculoskeletal:  Positive for neck pain. Negative for back pain, joint swelling and neck stiffness.        Right pectoral pain/tenderness   Skin:  Negative for rash.   Neurological:  Negative for weakness and numbness.     Current Outpatient Medications   Medication Sig Dispense Refill    ibuprofen 600 MG Oral Tab Take 1 tablet (600 mg total) by mouth every 6 (six) hours as needed for Pain. 20 tablet 0    docusate sodium 100 MG Oral Cap Take 100 mg by mouth 2 (two) times daily as needed for constipation. 30 capsule 0    Cyanocobalamin (VITAMIN B 12 OR) Take by mouth.        ferrous sulfate 325 (65 FE) MG Oral Tab EC Take 1 tablet (325 mg total) by mouth 2 (two) times daily with meals.      Ascorbic Acid (VITAMIN C) 1000 MG Oral Tab Take 1 tablet (1,000 mg total) by mouth daily.      fluticasone propionate  50 MCG/ACT Nasal Suspension 2 sprays by Each Nare route daily. (Patient not taking: Reported on 2024) 1 each 0    Norethindrone (JOLIVETTE) 0.35 MG Oral Tab Take 1 tablet (0.35 mg total) by mouth daily. (Patient not taking: Reported on 2022) 3 each 1    acetaminophen 500 MG Oral Tab Take 1 tablet (500 mg total) by mouth every 6 (six) hours as needed for Pain. (Patient not taking: Reported on 9/15/2023)      PRENATAL 27-0.8 MG Oral Tab Take 1 tablet by mouth daily. (Patient not taking: Reported on 2022)       Allergies:  Allergies   Allergen Reactions    [place] HIVES and ITCHING    Peanuts [Peanut Oil] SWELLING    Iron Dextran Coughing and ITCHING    Latex RASH    Latex RASH       Past Medical History:   Diagnosis Date    Anemia     STARTED IRON TABS X 2WEEKS AGO    Decorative tattoo     Gallbladder polyp     Migraine     UTI (urinary tract infection)     Frequent      Past Surgical History:   Procedure Laterality Date            Family History   Problem Relation Age of Onset    Diabetes Father     Lipids Father     Hypertension Father     Lipids Mother       Social History:   Social History     Socioeconomic History    Marital status:    Tobacco Use    Smoking status: Never     Passive exposure: Never    Smokeless tobacco: Never   Vaping Use    Vaping Use: Never used   Substance and Sexual Activity    Alcohol use: No    Drug use: No   Other Topics Concern    Caffeine Concern No        Objective:   Physical Exam  Constitutional:       General: She is not in acute distress.     Appearance: She is not ill-appearing, toxic-appearing or diaphoretic.   Eyes:      General: No scleral icterus.        Right eye: No discharge.         Left eye: No discharge.   Cardiovascular:      Rate and Rhythm: Normal rate and regular rhythm.      Pulses: Normal pulses.      Heart sounds: Normal heart sounds. No murmur heard.  Pulmonary:      Effort: Pulmonary effort is normal. No respiratory distress.      Breath  sounds: Normal breath sounds. No wheezing or rales.   Musculoskeletal:      Cervical back: Normal range of motion and neck supple. No rigidity or tenderness.      Right lower leg: No edema.      Left lower leg: No edema.      Comments: No synovitis on phalangeal joints; no swelling or edema noted both hands currently   Lymphadenopathy:      Cervical: No cervical adenopathy.   Skin:     Coloration: Skin is not jaundiced or pale.   Neurological:      Mental Status: She is alert.         Assessment & Plan:   (M54.2) Neck pain  (primary encounter diagnosis)  Plan: Physiatry Referral - In Network        Pt has pain intermittently from riight side of neck, and now on the right pectoral muscle going down the right arm. ?radiculopathy  There was no masses felt on the axillary area bilaterally.  Would have her see physiatry for possibility of radicular pain .    (R05.3) Chronic cough  Plan: XR CHEST PA + LAT CHEST (ERF=[id number])        Complained of coughing for at least 6 weeks or more. I told her to get cxr. Pt currently has her menses and denies being pregnant .    (R60.0) Hand edema  Plan: Comp Metabolic Panel (14) [E]        Pt noting puffiness of hands anf fingers with some stiffness noted only in am. Will check her renal/liver function. No signs of synovitis to suggest inflammatory arthritis.     (K82.4) Gallbladder polyp  Plan: Surgery Referral - In Network        Pt hx of GB polyps evaluated by GI clinic last year and was advised then to see surgeon which she has not done. Referral given for pt to see Dr Carrion.        No orders of the defined types were placed in this encounter.      Meds This Visit:  Requested Prescriptions      No prescriptions requested or ordered in this encounter       Imaging & Referrals:  None

## 2024-03-20 NOTE — ED PROVIDER NOTES
Patient Seen in: [facility] Emergency Department      History     Chief Complaint   Patient presents with    Nausea/Vomiting/Diarrhea    Fever     Stated Complaint: fever, NVD    Subjective:   HPI    38-year-old female presents for evaluation for nausea vomiting diarrhea and fever since Sunday 3 days ago.  She did have some right upper quadrant pain that has since resolved.  She denies any cough, congestion, sore throat, dysuria, hematuria.  She denies any sick contacts.    Objective:   No pertinent past medical history.            No pertinent past surgical history.              No pertinent social history.            Review of Systems    Positive for stated complaint: fever, NVD  Other systems are as noted in HPI.  Constitutional and vital signs reviewed.      All other systems reviewed and negative except as noted above.    Physical Exam     ED Triage Vitals [03/20/24 0858]   /79   Pulse 86   Resp 18   Temp 98.8 °F (37.1 °C)   Temp src Temporal   SpO2 100 %   O2 Device None (Room air)       Current:/79   Pulse 86   Temp 98.8 °F (37.1 °C) (Temporal)   Resp 18   Ht 152.4 cm (5')   Wt 52.2 kg   LMP 02/18/2024 (Exact Date)   SpO2 100%   BMI 22.46 kg/m²         Physical Exam  Vitals and nursing note reviewed.   Constitutional:       Appearance: Normal appearance.   HENT:      Head: Normocephalic and atraumatic.   Cardiovascular:      Rate and Rhythm: Normal rate and regular rhythm.      Pulses: Normal pulses.      Heart sounds: Normal heart sounds.   Pulmonary:      Effort: Pulmonary effort is normal.      Breath sounds: Normal breath sounds.   Abdominal:      General: Bowel sounds are normal.      Palpations: Abdomen is soft.      Tenderness: There is no abdominal tenderness. There is no guarding or rebound.   Musculoskeletal:         General: Normal range of motion.      Cervical back: Normal range of motion.   Skin:     General: Skin is warm and dry.   Neurological:      General: No  focal deficit present.      Mental Status: She is alert.               ED Course     Labs Reviewed   COMP METABOLIC PANEL (14) - Abnormal; Notable for the following components:       Result Value    Globulin  2.7 (*)     All other components within normal limits   CBC W/ DIFFERENTIAL - Abnormal; Notable for the following components:    WBC 2.7 (*)     Neutrophil Absolute Prelim 1.06 (*)     Neutrophil Absolute 1.06 (*)     All other components within normal limits   POCT PREGNANCY URINE - Normal   SARS-COV-2/FLU A AND B/RSV BY PCR (GENEXPERT) - Normal    Narrative:     This test is intended for the qualitative detection and differentiation of SARS-CoV-2, influenza A, influenza B, and respiratory syncytial virus (RSV) viral RNA in nasopharyngeal or nares swabs from individuals suspected of respiratory viral infection consistent with COVID-19 by their healthcare provider. Signs and symptoms of respiratory viral infection due to SARS-CoV-2, influenza, and RSV can be similar.    Test performed using the Xpert Xpress SARS-CoV-2/FLU/RSV (real time RT-PCR)  assay on the [employer] instrument, [employer], [place] [address].   This test is being used under the Food and Drug Administration's Emergency Use Authorization.    The authorized Fact Sheet for Healthcare Providers for this assay is available upon request from the laboratory.   CBC WITH DIFFERENTIAL WITH PLATELET    Narrative:     The following orders were created for panel order CBC With Differential With Platelet.  Procedure                               Abnormality         Status                     ---------                               -----------         ------                     CBC W/ DIFFERENTIAL[[id number]]          Abnormal            Final result                 Please view results for these tests on the individual orders.   SCAN SLIDE                      MDM                                         Medical Decision Making  Differential diagnosis includes  but is not limited to viral gastroenteritis, COVID-19, influenza, cholelithiasis or cholecystitis, etc.    Patient's abdominal exam is benign, I do not suspect any cholecystitis or cholelithiasis.  CT imaging of the abdomen pelvis considered but not obtained given benign exam and normal labs.  Respiratory viral panel is negative.  Patient likely with viral gastroenteritis.  She was feeling better after some IV fluids Zofran and Advil.  She be discharged home with some Zofran and Bentyl with dietary instructions.    Medical Record Review: I personally reviewed available prior medical records for any recent pertinent discharge summaries, testing, and procedures, and reviewed those reports.    Complicating factors: The patient  has a past medical history of Anemia, Decorative tattoo, Gallbladder polyp, Migraine, and UTI (urinary tract infection). and  has a past surgical history that includes . that contribute to the medical complexity of this ED evaluation.     Clinical impression as well as any lab results and radiology findings were discussed with the patient and/or caregiver. I personally reviewed all laboratory results and radiology images myself. Patient is advised to follow up with PCP for reevaluation. I provided discharge instructions and return precautions. Patient and/or caregiver voices understanding and agreement with the treatment plan. All questions were addressed and answered.         Problems Addressed:  Nausea vomiting and diarrhea: acute illness or injury with systemic symptoms    Amount and/or Complexity of Data Reviewed  Labs: ordered. Decision-making details documented in ED Course.    Risk  Prescription drug management.        Disposition and Plan     Clinical Impression:  1. Nausea vomiting and diarrhea         Disposition:  Discharge  3/20/2024 10:37 am    Follow-up:  Sid eMlendez MD  [address] [place] [address]  [phone]    Follow up            Medications  Prescribed:  Current Discharge Medication List        START taking these medications    Details   ondansetron 4 MG Oral Tablet Dispersible Take 1 tablet (4 mg total) by mouth every 4 (four) hours as needed for Nausea.  Qty: 10 tablet, Refills: 0      dicyclomine 20 MG Oral Tab Take 1 tablet (20 mg total) by mouth 4 (four) times daily as needed (abdominal pain).  Qty: 30 tablet, Refills: 0

## 2024-06-24 NOTE — ED PROVIDER NOTES
Patient Seen in: [facility] Emergency Department      History     Chief Complaint   Patient presents with    Numbness     Stated Complaint:     Subjective:   HPI    37 yo female, presenting for evaluation of left upper extremity tingling.  She is brought in by EMS from home.  At home she developed a pins and needle sensation and tingling involving the left upper extremity.  She endorses neck pain.  No numbness or weakness.  Pain also radiates into her chest.  She denies history of blood clots or family history of cardiac disease.  No shortness of breath.  No calf pain swelling or hemoptysis.    Objective:   Past Medical History:    Anemia    STARTED IRON TABS X 2WEEKS AGO    Decorative tattoo    Gallbladder polyp    Migraine    UTI (urinary tract infection)    Frequent              Past Surgical History:   Procedure Laterality Date                      Social History     Socioeconomic History    Marital status:    Tobacco Use    Smoking status: Never     Passive exposure: Never    Smokeless tobacco: Never   Vaping Use    Vaping status: Never Used   Substance and Sexual Activity    Alcohol use: No    Drug use: No   Other Topics Concern    Caffeine Concern No     Social Determinants of Health      [facility], [facility]    Social Connections    Received from [facility], [facility]    Housing Stability              Review of Systems    Positive for stated complaint:   Other systems are as noted in HPI.  Constitutional and vital signs reviewed.      All other systems reviewed and negative except as noted above.    Physical Exam     ED Triage Vitals [24]   /90   Pulse 92   Resp 16   Temp 99.1 °F (37.3 °C)   Temp src Temporal   SpO2 99 %   O2 Device None (Room air)       Current Vitals:   Vital Signs  BP: 98/67  Pulse: 67  Resp: 16  Temp: 99.1 °F (37.3 °C)  Temp src: Temporal  MAP (mmHg):  77    Oxygen Therapy  SpO2: 99 %  O2 Device: None (Room air)            Physical Exam    Constitutional: awake, alert, no sig distress  HENT: mmm, no lesions,  Neck: normal range of motion, L Cervical paraspinal TTP +Left spurling sign  Eyes: PERRL, EOMI, conjunctiva normal, no discharge. Sclera anicteric.  Cardiovascular: rr no murmur  Respiratory: Normal breath sounds, no respiratory distress, no wheezing, no chest tenderness.  GI: Bowel sounds normal, Soft, no tenderness, no masses, no pulsatile masses.  : No CVA tenderness.  Skin: Warm, dry, no erythema, no rash.  Musculoskeletal: Intact distal pulses, no edema, no tenderness, no cyanosis, no clubbing. Good range of motion in all major joints. No tenderness to palpation or major deformities noted. Back- No tenderness.  Neurologic: Alert & oriented x 3, normal motor function, normal sensory function, no focal deficits noted.  Psych: Calm, cooperative, nl affect      ED Course     Labs Reviewed   BASIC METABOLIC PANEL (8) - Abnormal; Notable for the following components:       Result Value    Glucose 103 (*)     Potassium 3.4 (*)     All other components within normal limits   POCT GLUCOSE - Abnormal; Notable for the following components:    POC Glucose  128 (*)     All other components within normal limits   CBC W/ DIFFERENTIAL - Abnormal; Notable for the following components:    HGB 11.9 (*)     All other components within normal limits   TROPONIN I HIGH SENSITIVITY - Normal   CBC WITH DIFFERENTIAL WITH PLATELET    Narrative:     The following orders were created for panel order CBC With Differential With Platelet.  Procedure                               Abnormality         Status                     ---------                               -----------         ------                     CBC W/ DIFFERENTIAL[[id number]]          Abnormal            Final result                 Please view results for these tests on the individual orders.     EKG#1 - lead  reversal, not a diagnostic ECG so was repeated  EKG#2    Rate, intervals and axes as noted on EKG Report.  Rate: 78  Rhythm: Sinus Rhythm  Reading: NSR, borderline right axis/intervals, no st or t wave changes, no stemi                 ED Course as of 06/24/24 0156  ------------------------------------------------------------  Time: 06/23 2210  Comment: IMPRESSION:    No acute abnormality    No infiltrate or pleural effusion or pneumothorax    Normal heart size    Visualized osseous structures appear normal for age                MDM      38F presenting with LUE tingling, L neck pain. On arrival vss, reassuring  Ddx: cervical radiculopathy, ACS, metabolic derangement  Low suspicion for acute ischemic stroke    I have independently reviewed patient's chest x-ray do not appreciate any acute cardiopulmonary findings.  Labs remarkable for negative troponin no other acute findings.  Will discharge on treatment for cervical radiculitis.  Return precautions and follow-up instructions were discussed with patient who voiced understanding and agreement the plan.  All questions were answered to patient satisfaction.                               Medical Decision Making      Disposition and Plan     Clinical Impression:  1. Cervical radiculopathy         Disposition:  Discharge  6/23/2024 10:27 pm    Follow-up:  Sid Melendez MD  [address] [place] [address] [address]  [facility] [id number] [phone]    Follow up in 2 day(s)      [facility] Emergency Department  [address]  [facility] [address] [phone]  Follow up  As needed, If symptoms worsen    Damien Arnold Y, DO  [address] [facility] [address]  [facility] [address] [phone]    Call            Medications Prescribed:  Discharge Medication List as of 6/23/2024 10:28 PM        START taking these medications    Details   methylPREDNISolone (MEDROL) 4 MG Oral Tablet Therapy Pack Dosepack: take as directed, Normal, Disp-1 each, R-0      naproxen 500 MG Oral Tab  Take 1 tablet (500 mg total) by mouth 2 (two) times daily as needed., Normal, Disp-14 tablet, R-0      cyclobenzaprine 10 MG Oral Tab Take 1 tablet (10 mg total) by mouth 3 (three) times daily as needed for Muscle spasms., Normal, Disp-20 tablet, R-0

## 2025-02-19 NOTE — PROGRESS NOTES
Chief Complaint   Patient presents with    Gyn Exam     ANNUAL EXAM Reviewed Preventative/Wellness form with patient.          HPI:  The patient is a 38 yo F here for WWE.  Monthly cycles.  /monogamous.  Wants BC.  H/o migraine with aura.      Patient's last menstrual period was 2025 (approximate).        Latest Ref Rng & Units 2021     9:42 AM   RECENT PAP RESULTS   INTERPRETATION/RESULT: Negative for intraepithelial lesion or malignancy Negative for intraepithelial lesion or malignancy    HPV Negative Negative         Hx Prior Abnormal Pap: Yes  Pap Date: 21  Pap Result Notes: PAP/HPV NEG  Follow Up Recommendation: LAST ANNUAL  23 ULISES      Chaperone: declines      Depression Screening (PHQ-2/PHQ-9): Over the LAST 2 WEEKS   Little interest or pleasure in doing things (over the last two weeks)?: Not at all    Feeling down, depressed, or hopeless (over the last two weeks)?: Not at all    PHQ-2 SCORE: 0          Reviewed medical and surgical history below       OBSTETRICS HISTORY:  OB History    Para Term  AB Living   6 5 5 0 1 5   SAB IAB Ectopic Multiple Live Births   1 0 0 0 5       GYNE HISTORY:  History   Sexual Activity    Sexual activity: Not on file            MEDICAL HISTORY:  Past Medical History:    Anemia    STARTED IRON TABS X 2WEEKS AGO    Decorative tattoo    Gallbladder polyp    Migraine    with aura    UTI (urinary tract infection)    Frequent       SURGICAL HISTORY:  Past Surgical History:   Procedure Laterality Date             SOCIAL HISTORY:  Social History     Socioeconomic History    Marital status:      Spouse name: Not on file    Number of children: Not on file    Years of education: Not on file    Highest education level: Not on file   Occupational History    Not on file   Tobacco Use    Smoking status: Never     Passive exposure: Never    Smokeless tobacco: Never   Vaping Use    Vaping status: Never Used   Substance and Sexual Activity     Alcohol use: No    Drug use: No    Sexual activity: Not on file   Other Topics Concern     Service Not Asked    Blood Transfusions Not Asked    Caffeine Concern No    Occupational Exposure Not Asked    Hobby Hazards Not Asked    Sleep Concern Not Asked    Stress Concern Not Asked    Weight Concern Not Asked    Special Diet Not Asked    Back Care Not Asked    Exercise Not Asked    Bike Helmet Not Asked    Seat Belt Not Asked    Self-Exams Not Asked   Social History Narrative    Not on file     Social Drivers of Health     Food Insecurity: Not on file   Transportation Needs: Not on file   Stress: Not on file   Housing Stability: Low Risk  (7/18/2021)    [facility]    Housing Stability     Mortgage Payment Concerns?: Not on file     Number of Places Lived in the Last Year: Not on file     Unstable Housing?: Not on file       FAMILY HISTORY:  Family History   Problem Relation Age of Onset    Diabetes Father     Lipids Father     Hypertension Father     Lipids Mother        MEDICATIONS:    Current Outpatient Medications:     methylPREDNISolone (MEDROL) 4 MG Oral Tablet Therapy Pack, Dosepack: take as directed, Disp: 1 each, Rfl: 0    naproxen 500 MG Oral Tab, Take 1 tablet (500 mg total) by mouth 2 (two) times daily as needed., Disp: 14 tablet, Rfl: 0    ibuprofen 600 MG Oral Tab, Take 1 tablet (600 mg total) by mouth every 6 (six) hours as needed for Pain., Disp: 20 tablet, Rfl: 0    docusate sodium 100 MG Oral Cap, Take 100 mg by mouth 2 (two) times daily as needed for constipation., Disp: 30 capsule, Rfl: 0    Cyanocobalamin (VITAMIN B 12 OR), Take by mouth.  , Disp: , Rfl:     ferrous sulfate 325 (65 FE) MG Oral Tab EC, Take 1 tablet (325 mg total) by mouth 2 (two) times daily with meals., Disp: , Rfl:     Ascorbic Acid (VITAMIN C) 1000 MG Oral Tab, Take 1 tablet (1,000 mg total) by mouth daily., Disp: , Rfl:     Norethindrone (JOLIVETTE) 0.35  MG Oral Tab, Take 1 tablet (0.35 mg total) by mouth daily. (Patient not taking: Reported on 8/2/2022), Disp: 3 each, Rfl: 1    acetaminophen 500 MG Oral Tab, Take 1 tablet (500 mg total) by mouth every 6 (six) hours as needed for Pain. (Patient not taking: Reported on 2/19/2025), Disp: , Rfl:     PRENATAL 27-0.8 MG Oral Tab, Take 1 tablet by mouth daily. (Patient not taking: Reported on 2/19/2025), Disp: , Rfl:     ALLERGIES:  Allergies[1]      Review of Systems:  Constitutional:  Denies fevers and chills   Cardiovascular:  denies chest pain or palpitations  Respiratory:  denies shortness of breath  Gastrointestinal:  denies heartburn, abdominal pain, diarrhea or constipation  Genitourinary:  denies dysuria, incontinence, abnormal vaginal discharge, vaginal itching  Musculoskeletal:  denies back pain.  Skin/Breast:  Denies any breast pain, lumps, or discharge.   Neurological:  denies headaches, extremity weakness  Psychiatric: denies depression or anxiety.      Vitals:    02/19/25 1001   BP: 110/73   Pulse: 73       PHYSICAL EXAM:   Constitutional: well developed, well nourished  Head/Face: normocephalic  Neck/Thyroid: thyroid symmetric, no thyromegaly, no nodules, no adenopathy  Heart: Regular rate and rhythm   Lungs: clear to ascultation bilaterally   Lymphatic:no abnormal supraclavicular or axillary adenopathy is noted  Breast: normal without palpable masses, tenderness, asymmetry, nipple discharge, nipple retraction or skin changes  Abdomen:  soft, nontender, nondistended, no masses  Skin/Hair: no unusual rashes or bruises  Extremities: no edema, no cyanosis  Psychiatric: Appropriate mood and affect    Pelvic Exam:  External Genitalia: normal appearance, hair distribution, and no lesions  Urethral Meatus:  normal in size, location, without lesions and prolapse  Bladder:  No fullness, masses or tenderness  Vagina:  Normal appearance without lesions, no abnormal discharge  Cervix:  Normal without tenderness on  motion  Uterus: normal in size, contour, position, mobility, without tenderness  Adnexa: normal without masses or tenderness  Perineum: normal    Assessment/Plan:  Chanelle was seen today for gyn exam.    Diagnoses and all orders for this visit:    Well woman exam    Cervical cancer screening    Screening mammogram, encounter for  -     [facility] VERNA 2D+3D SCREENING BILAT (CPT=77067/81163); Future    Birth control counseling        WWE:   Reviewed ASCCP guidelines with the patient -- Pap today  Contraception: discussed all options; r/b reviewed.  Would avoid E2 options given migraine with aura.  Patient not good with taking OCs.  Rec'd IUD.  Handouts provided.  To schedule with next cycle. Urine pregnancy test day of and cytotec the night before.  Motrin 1 hour before.    Breast Health:     Mammo @ 39 yo---ordered  Encouraged monthly self breast exams with the patient   Discussed diet, exercise, MVIs with Vit D  Follow up in 1 yr for WWE             [1]   Allergies  Allergen Reactions    [place] HIVES and ITCHING    Peanuts [Peanut Oil] SWELLING    Iron Dextran Coughing and ITCHING    Latex RASH    Latex RASH

## 2025-03-06 NOTE — PROGRESS NOTES
Subjective:     Patient ID: Chanelle Childers is a 39 year old female.    Pt presents today for her annual physical.         History/Other:   Review of Systems   Constitutional: Negative.  Negative for appetite change, fever and unexpected weight change.   HENT: Negative.     Eyes: Negative.    Respiratory: Negative.     Cardiovascular: Negative.  Negative for chest pain, palpitations and leg swelling.   Gastrointestinal: Negative.  Negative for anal bleeding and blood in stool.   Genitourinary:  Positive for frequency. Negative for dysuria and hematuria.   Allergic/Immunologic: Positive for environmental allergies and food allergies. Negative for immunocompromised state.   Neurological:  Negative for syncope.   Hematological: Negative.      Current Outpatient Medications   Medication Sig Dispense Refill    naproxen 500 MG Oral Tab Take 1 tablet (500 mg total) by mouth 2 (two) times daily as needed. 14 tablet 0    ibuprofen 600 MG Oral Tab Take 1 tablet (600 mg total) by mouth every 6 (six) hours as needed for Pain. 20 tablet 0    acetaminophen 500 MG Oral Tab Take 1 tablet (500 mg total) by mouth every 6 (six) hours as needed for Pain.      Cyanocobalamin (VITAMIN B 12 OR) Take by mouth.        Ascorbic Acid (VITAMIN C) 1000 MG Oral Tab Take 1 tablet (1,000 mg total) by mouth daily.      Norethindrone (JOLIVETTE) 0.35 MG Oral Tab Take 1 tablet (0.35 mg total) by mouth daily. (Patient not taking: Reported on 8/2/2022) 3 each 1    docusate sodium 100 MG Oral Cap Take 100 mg by mouth 2 (two) times daily as needed for constipation. (Patient not taking: Reported on 3/6/2025) 30 capsule 0    ferrous sulfate 325 (65 FE) MG Oral Tab EC Take 1 tablet (325 mg total) by mouth 2 (two) times daily with meals. (Patient not taking: Reported on 3/6/2025)       Allergies:Allergies[1]    Past Medical History:    Anemia    STARTED IRON TABS X 2WEEKS AGO    Decorative tattoo    Gallbladder polyp    Migraine    with aura    UTI (urinary  tract infection)    Frequent      Past Surgical History:   Procedure Laterality Date            Family History   Problem Relation Age of Onset    Diabetes Father     Lipids Father     Hypertension Father     Lipids Mother       Social History:   Social History     Socioeconomic History    Marital status:    Tobacco Use    Smoking status: Never     Passive exposure: Never    Smokeless tobacco: Never   Vaping Use    Vaping status: Never Used   Substance and Sexual Activity    Alcohol use: No    Drug use: No   Other Topics Concern    Caffeine Concern No     Social Drivers of Health      [facility]    Housing Stability        Objective:   Physical Exam  Constitutional:       General: She is not in acute distress.     Appearance: Normal appearance. She is well-developed. She is not ill-appearing, toxic-appearing or diaphoretic.   HENT:      Head: Normocephalic and atraumatic.      Right Ear: External ear normal.      Left Ear: External ear normal.      Nose: Nose normal.      Mouth/Throat:      Pharynx: No oropharyngeal exudate.   Eyes:      General: No scleral icterus.        Right eye: No discharge.         Left eye: No discharge.      Conjunctiva/sclera: Conjunctivae normal.      Pupils: Pupils are equal, round, and reactive to light.   Neck:      Vascular: No JVD.   Cardiovascular:      Rate and Rhythm: Normal rate and regular rhythm.      Heart sounds: Normal heart sounds. No murmur heard.     No gallop.   Pulmonary:      Effort: Pulmonary effort is normal. No respiratory distress.      Breath sounds: Normal breath sounds. No wheezing or rales.   Abdominal:      General: Bowel sounds are normal. There is no distension.      Palpations: Abdomen is soft. There is no mass.      Tenderness: There is no abdominal tenderness. There is no guarding or rebound.   Musculoskeletal:         General: No tenderness. Normal range of motion.      Cervical  back: Normal range of motion and neck supple. No rigidity or tenderness.      Right lower leg: No edema.      Left lower leg: No edema.   Lymphadenopathy:      Cervical: No cervical adenopathy.   Skin:     General: Skin is warm and dry.      Findings: No rash.   Neurological:      Mental Status: She is alert and oriented to person, place, and time.         Assessment & Plan:   (Z00.00) Annual physical exam  (primary encounter diagnosis)  Plan: CBC With Differential With Platelet, Comp         Metabolic Panel (14), Hemoglobin A1C, Lipid         Panel, TSH W Reflex To Free T4, Urinalysis with        Culture Reflex        Routine labs ordered;  pt had seen her gyne for her papsmear and pelvic exam;  pt declined flushot.     (K82.4) Gallbladder polyp  Plan:  had been ff by her GI.     (Z12.4) Cervical cancer screening  Plan: pt states curretn with her papsmear with gyne .        No orders of the defined types were placed in this encounter.      Meds This Visit:  Requested Prescriptions      No prescriptions requested or ordered in this encounter       Imaging & Referrals:  None            [1]   Allergies  Allergen Reactions    [place] HIVES and ITCHING    Peanuts [Peanut Oil] SWELLING    Iron Dextran Coughing and ITCHING    Latex RASH    Latex RASH

## (undated) NOTE — LETTER
2021                   To Whom It May Concern,      Chanelle Childers ( 1985) is currently under my care for pregnancy and may continue with physical therapy.          Sincerely,              Reginald Mejia MD  [id number]

## (undated) NOTE — LETTER
[facility]  Administration of Anesthesia  1. Leigh Latham Sales, or _________________________________ acting on her behalf, (Patient) (Dependent/Representative) request to receive anesthesia for my pending procedure/operation/treatment.   A bleeding, seizure, cardiac arrest and death. 7. AWARENESS: I understand that it is possible (but unlikely) to have explicit memory of events from the operating room while under general anesthesia.   8. ELECTROCONVULSIVE THERAPY PATIENTS: This consent serve below affirms that prior to the time of the procedure, I have explained to the patient and/or his/her guardian, the risks and benefits of undergoing anesthesia, as well as any reasonable alternatives.     ___________________________________________________

## (undated) NOTE — MR AVS SNAPSHOT
[address], [address]  [address] [facility]  [phone]               Thank you for choosing us for your health care visit with George Mcdaniels MD.  We are glad to serve you and happy to provide you with this s between 7:30am to 6pm and on Saturday between 8am and 1pm. Evening and weekend appointments for your exam are available. Walk-in patients are welcome for most exams.      [facility] and [address] [place] [address] Freddie Component Value Standard Range & Units    Glucose 91 70-99 mg/dL    Sodium 137 136-144 mmol/L    Potassium 3.6 3.3-5.1 mmol/L    Chloride 105  mmol/L    CO2 23 22-32 mmol/L    BUN 9 8-20 mg/dL    Creatinine 0.81 0.50-1.50 mg/dL    Calcium, Total 8. level of detectable Parvovirus                               B19 IgM antibody. 0.90 - 1.10 IV . .......... Equivocal - Repeat testing in                               10-14 days may be helpful. 1.11 IV or greater . ...... Farrukh Burr which may indicate a current                               or past infection.     Patients in the early stage of Dengue Fever Virus infection may   not have detectable IgG antibodies, as the IgG response may take   several weeks MyChart     Visit [facility]  You can access your MyChart to more actively manage your health care and view more details from this visit by going to [email or web address].   If you've recently had a stay at the Hospital you can access your d

## (undated) NOTE — LETTER
VACCINE ADMINISTRATION RECORD  PARENT / GUARDIAN APPROVAL  Date: 2021  Vaccine administered to: Abraham Kathleen     : 1985    MRN: [id number]    A copy of the appropriate Centers for Disease Control and Prevention Vaccine Information statement